# Patient Record
Sex: MALE | Race: WHITE | NOT HISPANIC OR LATINO | URBAN - METROPOLITAN AREA
[De-identification: names, ages, dates, MRNs, and addresses within clinical notes are randomized per-mention and may not be internally consistent; named-entity substitution may affect disease eponyms.]

---

## 2017-01-04 ENCOUNTER — OUTPATIENT (OUTPATIENT)
Dept: OUTPATIENT SERVICES | Facility: HOSPITAL | Age: 72
LOS: 1 days | End: 2017-01-04
Payer: MEDICARE

## 2017-01-04 ENCOUNTER — APPOINTMENT (OUTPATIENT)
Dept: MRI IMAGING | Facility: CLINIC | Age: 72
End: 2017-01-04

## 2017-01-04 DIAGNOSIS — Z00.8 ENCOUNTER FOR OTHER GENERAL EXAMINATION: ICD-10-CM

## 2017-01-04 PROCEDURE — A9585: CPT

## 2017-01-04 PROCEDURE — 70553 MRI BRAIN STEM W/O & W/DYE: CPT

## 2017-01-04 PROCEDURE — 82565 ASSAY OF CREATININE: CPT

## 2017-01-11 ENCOUNTER — RESULT CHARGE (OUTPATIENT)
Age: 72
End: 2017-01-11

## 2017-01-11 ENCOUNTER — APPOINTMENT (OUTPATIENT)
Dept: INFUSION THERAPY | Facility: CLINIC | Age: 72
End: 2017-01-11

## 2017-01-11 ENCOUNTER — LABORATORY RESULT (OUTPATIENT)
Age: 72
End: 2017-01-11

## 2017-01-11 VITALS
BODY MASS INDEX: 26.52 KG/M2 | HEIGHT: 68 IN | TEMPERATURE: 98.3 F | DIASTOLIC BLOOD PRESSURE: 83 MMHG | SYSTOLIC BLOOD PRESSURE: 120 MMHG | WEIGHT: 175 LBS | HEART RATE: 89 BPM

## 2017-01-11 LAB
BILIRUB UR QL STRIP: NEGATIVE
CLARITY UR: ABNORMAL
COLLECTION METHOD: NORMAL
GLUCOSE UR-MCNC: NEGATIVE
HCG UR QL: 1 EU/DL
HCT VFR BLD CALC: 44.6 %
HGB BLD-MCNC: 15.7 G/DL
HGB UR QL STRIP.AUTO: NEGATIVE
KETONES UR-MCNC: ABNORMAL
LEUKOCYTE ESTERASE UR QL STRIP: NEGATIVE
MCHC RBC-ENTMCNC: 35.3 GM/DL
MCHC RBC-ENTMCNC: 40 PG
MCV RBC AUTO: 113 FL
NITRITE UR QL STRIP: NEGATIVE
PH UR STRIP: 6
PLATELET # BLD AUTO: 225 K/UL
PROT UR STRIP-MCNC: ABNORMAL
RBC # BLD: 3.93 M/UL
RBC # FLD: 10.7 %
SP GR UR STRIP: 1.02
WBC # FLD AUTO: 12 K/UL

## 2017-01-12 LAB
ALBUMIN SERPL ELPH-MCNC: 4.6 G/DL
ALP BLD-CCNC: 83 U/L
ALT SERPL-CCNC: 43 U/L
ANION GAP SERPL CALC-SCNC: 19 MMOL/L
AST SERPL-CCNC: 35 U/L
BILIRUB SERPL-MCNC: 0.3 MG/DL
BUN SERPL-MCNC: 20 MG/DL
CALCIUM SERPL-MCNC: 10.1 MG/DL
CEA SERPL-MCNC: 18.6 NG/ML
CHLORIDE SERPL-SCNC: 98 MMOL/L
CO2 SERPL-SCNC: 23 MMOL/L
CREAT SERPL-MCNC: 0.9 MG/DL
GLUCOSE SERPL-MCNC: 117 MG/DL
POTASSIUM SERPL-SCNC: 4.7 MMOL/L
PROT SERPL-MCNC: 7.5 G/DL
SODIUM SERPL-SCNC: 140 MMOL/L

## 2017-01-20 ENCOUNTER — RX RENEWAL (OUTPATIENT)
Age: 72
End: 2017-01-20

## 2017-01-20 RX ORDER — AMLODIPINE BESYLATE 5 MG/1
5 TABLET ORAL
Refills: 0 | Status: ACTIVE | COMMUNITY
Start: 2017-01-20

## 2017-02-01 ENCOUNTER — LABORATORY RESULT (OUTPATIENT)
Age: 72
End: 2017-02-01

## 2017-02-01 ENCOUNTER — RESULT CHARGE (OUTPATIENT)
Age: 72
End: 2017-02-01

## 2017-02-01 ENCOUNTER — APPOINTMENT (OUTPATIENT)
Dept: INFUSION THERAPY | Facility: CLINIC | Age: 72
End: 2017-02-01

## 2017-02-01 ENCOUNTER — APPOINTMENT (OUTPATIENT)
Dept: HEMATOLOGY ONCOLOGY | Facility: CLINIC | Age: 72
End: 2017-02-01

## 2017-02-01 VITALS
TEMPERATURE: 97.5 F | DIASTOLIC BLOOD PRESSURE: 77 MMHG | HEIGHT: 68 IN | SYSTOLIC BLOOD PRESSURE: 126 MMHG | BODY MASS INDEX: 25.61 KG/M2 | HEART RATE: 114 BPM | WEIGHT: 169 LBS

## 2017-02-01 DIAGNOSIS — C79.31 SECONDARY MALIGNANT NEOPLASM OF BRAIN: ICD-10-CM

## 2017-02-01 LAB
HCT VFR BLD CALC: 42 %
HGB BLD-MCNC: 14.3 G/DL
MCHC RBC-ENTMCNC: 34.1 GM/DL
MCHC RBC-ENTMCNC: 37.7 PG
MCV RBC AUTO: 111 FL
PLATELET # BLD AUTO: 396 K/UL
RBC # BLD: 3.79 M/UL
RBC # FLD: 12 %
WBC # FLD AUTO: 13 K/UL

## 2017-02-02 LAB
ALBUMIN SERPL ELPH-MCNC: 4.2 G/DL
ALP BLD-CCNC: 103 U/L
ALT SERPL-CCNC: 40 U/L
ANION GAP SERPL CALC-SCNC: 18 MMOL/L
AST SERPL-CCNC: 61 U/L
BILIRUB SERPL-MCNC: 0.3 MG/DL
BUN SERPL-MCNC: 14 MG/DL
CALCIUM SERPL-MCNC: 10 MG/DL
CHLORIDE SERPL-SCNC: 101 MMOL/L
CO2 SERPL-SCNC: 24 MMOL/L
CREAT SERPL-MCNC: 1.19 MG/DL
GLUCOSE SERPL-MCNC: 124 MG/DL
POTASSIUM SERPL-SCNC: 5.7 MMOL/L
PROT SERPL-MCNC: 7.5 G/DL
SODIUM SERPL-SCNC: 143 MMOL/L

## 2017-02-22 ENCOUNTER — APPOINTMENT (OUTPATIENT)
Dept: INFUSION THERAPY | Facility: CLINIC | Age: 72
End: 2017-02-22

## 2017-02-28 ENCOUNTER — LABORATORY RESULT (OUTPATIENT)
Age: 72
End: 2017-02-28

## 2017-02-28 ENCOUNTER — APPOINTMENT (OUTPATIENT)
Dept: HEMATOLOGY ONCOLOGY | Facility: CLINIC | Age: 72
End: 2017-02-28

## 2017-02-28 ENCOUNTER — APPOINTMENT (OUTPATIENT)
Dept: INFUSION THERAPY | Facility: CLINIC | Age: 72
End: 2017-02-28

## 2017-02-28 VITALS
HEART RATE: 94 BPM | SYSTOLIC BLOOD PRESSURE: 120 MMHG | DIASTOLIC BLOOD PRESSURE: 78 MMHG | RESPIRATION RATE: 16 BRPM | TEMPERATURE: 97.9 F

## 2017-02-28 VITALS
SYSTOLIC BLOOD PRESSURE: 81 MMHG | BODY MASS INDEX: 25.01 KG/M2 | WEIGHT: 165 LBS | HEART RATE: 104 BPM | HEIGHT: 68 IN | DIASTOLIC BLOOD PRESSURE: 55 MMHG

## 2017-02-28 DIAGNOSIS — I95.9 HYPOTENSION, UNSPECIFIED: ICD-10-CM

## 2017-02-28 DIAGNOSIS — R63.0 ANOREXIA: ICD-10-CM

## 2017-02-28 LAB
HCT VFR BLD CALC: 42.6 %
HGB BLD-MCNC: 14.5 G/DL
MCHC RBC-ENTMCNC: 34 GM/DL
MCHC RBC-ENTMCNC: 36.1 PG
MCV RBC AUTO: 106 FL
PLATELET # BLD AUTO: 359 K/UL
RBC # BLD: 4.02 M/UL
RBC # FLD: 13.9 %
WBC # FLD AUTO: 8.4 K/UL

## 2017-03-01 LAB
ALBUMIN SERPL ELPH-MCNC: 3.2 G/DL
ALP BLD-CCNC: 114 U/L
ALT SERPL-CCNC: 26 U/L
ANION GAP SERPL CALC-SCNC: 26 MMOL/L
AST SERPL-CCNC: 71 U/L
BILIRUB SERPL-MCNC: 0.5 MG/DL
BUN SERPL-MCNC: 10 MG/DL
CALCIUM SERPL-MCNC: 9.6 MG/DL
CEA SERPL-MCNC: 17 NG/ML
CHLORIDE SERPL-SCNC: 95 MMOL/L
CO2 SERPL-SCNC: 14 MMOL/L
CREAT SERPL-MCNC: 1.28 MG/DL
GLUCOSE SERPL-MCNC: 99 MG/DL
POTASSIUM SERPL-SCNC: 4.5 MMOL/L
PROT SERPL-MCNC: 6.4 G/DL
SODIUM SERPL-SCNC: 135 MMOL/L

## 2017-03-06 ENCOUNTER — FORM ENCOUNTER (OUTPATIENT)
Age: 72
End: 2017-03-06

## 2017-03-07 ENCOUNTER — APPOINTMENT (OUTPATIENT)
Dept: CT IMAGING | Facility: CLINIC | Age: 72
End: 2017-03-07

## 2017-03-07 ENCOUNTER — OUTPATIENT (OUTPATIENT)
Dept: OUTPATIENT SERVICES | Facility: HOSPITAL | Age: 72
LOS: 1 days | End: 2017-03-07
Payer: MEDICARE

## 2017-03-07 DIAGNOSIS — Z00.8 ENCOUNTER FOR OTHER GENERAL EXAMINATION: ICD-10-CM

## 2017-03-07 PROCEDURE — 82565 ASSAY OF CREATININE: CPT

## 2017-03-07 PROCEDURE — 71260 CT THORAX DX C+: CPT

## 2017-03-07 PROCEDURE — 74177 CT ABD & PELVIS W/CONTRAST: CPT

## 2017-03-08 ENCOUNTER — LABORATORY RESULT (OUTPATIENT)
Age: 72
End: 2017-03-08

## 2017-03-08 ENCOUNTER — RESULT CHARGE (OUTPATIENT)
Age: 72
End: 2017-03-08

## 2017-03-08 ENCOUNTER — APPOINTMENT (OUTPATIENT)
Dept: INFUSION THERAPY | Facility: CLINIC | Age: 72
End: 2017-03-08

## 2017-03-08 ENCOUNTER — APPOINTMENT (OUTPATIENT)
Dept: HEMATOLOGY ONCOLOGY | Facility: CLINIC | Age: 72
End: 2017-03-08

## 2017-03-08 VITALS
HEIGHT: 68 IN | WEIGHT: 164 LBS | TEMPERATURE: 97.3 F | SYSTOLIC BLOOD PRESSURE: 89 MMHG | DIASTOLIC BLOOD PRESSURE: 58 MMHG | BODY MASS INDEX: 24.86 KG/M2 | HEART RATE: 70 BPM

## 2017-03-08 DIAGNOSIS — Z79.899 ENCOUNTER FOR PALLIATIVE CARE: ICD-10-CM

## 2017-03-08 DIAGNOSIS — Z51.5 ENCOUNTER FOR PALLIATIVE CARE: ICD-10-CM

## 2017-03-08 DIAGNOSIS — C34.90 MALIGNANT NEOPLASM OF UNSPECIFIED PART OF UNSPECIFIED BRONCHUS OR LUNG: ICD-10-CM

## 2017-03-09 LAB
ANION GAP SERPL CALC-SCNC: 28 MMOL/L
BUN SERPL-MCNC: 9 MG/DL
CALCIUM SERPL-MCNC: 9.8 MG/DL
CHLORIDE SERPL-SCNC: 100 MMOL/L
CO2 SERPL-SCNC: 14 MMOL/L
CREAT SERPL-MCNC: 1.17 MG/DL
GLUCOSE SERPL-MCNC: 92 MG/DL
HCT VFR BLD CALC: 41.2 %
HGB BLD-MCNC: 13.9 G/DL
MCHC RBC-ENTMCNC: 33.8 GM/DL
MCHC RBC-ENTMCNC: 35.8 PG
MCV RBC AUTO: 106 FL
PLATELET # BLD AUTO: 293 K/UL
POTASSIUM SERPL-SCNC: 4.1 MMOL/L
RBC # BLD: 3.88 M/UL
RBC # FLD: 14.5 %
SODIUM SERPL-SCNC: 142 MMOL/L
WBC # FLD AUTO: 8.3 K/UL

## 2017-03-10 ENCOUNTER — APPOINTMENT (OUTPATIENT)
Dept: INFUSION THERAPY | Facility: CLINIC | Age: 72
End: 2017-03-10

## 2017-03-10 VITALS
TEMPERATURE: 97.4 F | DIASTOLIC BLOOD PRESSURE: 65 MMHG | WEIGHT: 166 LBS | HEART RATE: 112 BPM | HEIGHT: 68 IN | BODY MASS INDEX: 25.16 KG/M2 | SYSTOLIC BLOOD PRESSURE: 93 MMHG

## 2017-03-29 ENCOUNTER — APPOINTMENT (OUTPATIENT)
Dept: INFUSION THERAPY | Facility: CLINIC | Age: 72
End: 2017-03-29

## 2017-03-31 ENCOUNTER — APPOINTMENT (OUTPATIENT)
Dept: INFUSION THERAPY | Facility: CLINIC | Age: 72
End: 2017-03-31

## 2017-03-31 ENCOUNTER — INPATIENT (INPATIENT)
Facility: HOSPITAL | Age: 72
LOS: 3 days | End: 2017-04-04
Attending: FAMILY MEDICINE | Admitting: FAMILY MEDICINE
Payer: SELF-PAY

## 2017-03-31 VITALS
WEIGHT: 162.04 LBS | HEIGHT: 68 IN | HEART RATE: 76 BPM | SYSTOLIC BLOOD PRESSURE: 104 MMHG | DIASTOLIC BLOOD PRESSURE: 64 MMHG | RESPIRATION RATE: 16 BRPM

## 2017-03-31 DIAGNOSIS — N17.9 ACUTE KIDNEY FAILURE, UNSPECIFIED: ICD-10-CM

## 2017-03-31 DIAGNOSIS — E86.0 DEHYDRATION: ICD-10-CM

## 2017-03-31 DIAGNOSIS — D63.0 ANEMIA IN NEOPLASTIC DISEASE: ICD-10-CM

## 2017-03-31 DIAGNOSIS — C34.90 MALIGNANT NEOPLASM OF UNSPECIFIED PART OF UNSPECIFIED BRONCHUS OR LUNG: ICD-10-CM

## 2017-03-31 DIAGNOSIS — C79.31 SECONDARY MALIGNANT NEOPLASM OF BRAIN: ICD-10-CM

## 2017-03-31 DIAGNOSIS — R26.9 UNSPECIFIED ABNORMALITIES OF GAIT AND MOBILITY: ICD-10-CM

## 2017-03-31 DIAGNOSIS — R74.8 ABNORMAL LEVELS OF OTHER SERUM ENZYMES: ICD-10-CM

## 2017-03-31 LAB
ALBUMIN SERPL ELPH-MCNC: 2.6 G/DL — LOW (ref 3.3–5)
ALP SERPL-CCNC: 125 U/L — HIGH (ref 40–120)
ALT FLD-CCNC: 32 U/L — SIGNIFICANT CHANGE UP (ref 12–78)
ANION GAP SERPL CALC-SCNC: 14 MMOL/L — SIGNIFICANT CHANGE UP (ref 5–17)
ANISOCYTOSIS BLD QL: SLIGHT — SIGNIFICANT CHANGE UP
APTT BLD: 53.3 SEC — HIGH (ref 27.5–37.4)
AST SERPL-CCNC: 139 U/L — HIGH (ref 15–37)
BASOPHILS # BLD AUTO: 0.1 K/UL — SIGNIFICANT CHANGE UP (ref 0–0.2)
BASOPHILS NFR BLD AUTO: 1 % — SIGNIFICANT CHANGE UP (ref 0–2)
BILIRUB SERPL-MCNC: 0.4 MG/DL — SIGNIFICANT CHANGE UP (ref 0.2–1.2)
BUN SERPL-MCNC: 15 MG/DL — SIGNIFICANT CHANGE UP (ref 7–23)
CALCIUM SERPL-MCNC: 9.2 MG/DL — SIGNIFICANT CHANGE UP (ref 8.5–10.1)
CHLORIDE SERPL-SCNC: 96 MMOL/L — SIGNIFICANT CHANGE UP (ref 96–108)
CO2 SERPL-SCNC: 25 MMOL/L — SIGNIFICANT CHANGE UP (ref 22–31)
CREAT SERPL-MCNC: 1.54 MG/DL — HIGH (ref 0.5–1.3)
DACRYOCYTES BLD QL SMEAR: SLIGHT — SIGNIFICANT CHANGE UP
EOSINOPHIL # BLD AUTO: 0.3 K/UL — SIGNIFICANT CHANGE UP (ref 0–0.5)
EOSINOPHIL NFR BLD AUTO: 5 % — SIGNIFICANT CHANGE UP (ref 0–6)
GLUCOSE SERPL-MCNC: 79 MG/DL — SIGNIFICANT CHANGE UP (ref 70–99)
HCT VFR BLD CALC: 38.4 % — LOW (ref 39–50)
HGB BLD-MCNC: 13 G/DL — SIGNIFICANT CHANGE UP (ref 13–17)
INR BLD: 1.18 RATIO — HIGH (ref 0.88–1.16)
LG PLATELETS BLD QL AUTO: SLIGHT — SIGNIFICANT CHANGE UP
LYMPHOCYTES # BLD AUTO: 1.4 K/UL — SIGNIFICANT CHANGE UP (ref 1–3.3)
LYMPHOCYTES # BLD AUTO: 36 % — SIGNIFICANT CHANGE UP (ref 13–44)
MACROCYTES BLD QL: SLIGHT — SIGNIFICANT CHANGE UP
MANUAL DIF COMMENT BLD-IMP: SIGNIFICANT CHANGE UP
MCHC RBC-ENTMCNC: 33.8 GM/DL — SIGNIFICANT CHANGE UP (ref 32–36)
MCHC RBC-ENTMCNC: 35.4 PG — HIGH (ref 27–34)
MCV RBC AUTO: 104.8 FL — HIGH (ref 80–100)
MONOCYTES # BLD AUTO: 1.2 K/UL — HIGH (ref 0–0.9)
MONOCYTES NFR BLD AUTO: 21 % — HIGH (ref 2–14)
NEUTROPHILS # BLD AUTO: 1.7 K/UL — LOW (ref 1.8–7.4)
NEUTROPHILS NFR BLD AUTO: 36 % — LOW (ref 43–77)
NEUTS BAND # BLD: 1 % — SIGNIFICANT CHANGE UP (ref 0–8)
NRBC # BLD: 2 /100 — HIGH (ref 0–0)
PLAT MORPH BLD: NORMAL — SIGNIFICANT CHANGE UP
PLATELET # BLD AUTO: 239 K/UL — SIGNIFICANT CHANGE UP (ref 150–400)
PLATELET COUNT - ESTIMATE: NORMAL — SIGNIFICANT CHANGE UP
POIKILOCYTOSIS BLD QL AUTO: SLIGHT — SIGNIFICANT CHANGE UP
POTASSIUM SERPL-MCNC: 3.1 MMOL/L — LOW (ref 3.5–5.3)
POTASSIUM SERPL-SCNC: 3.1 MMOL/L — LOW (ref 3.5–5.3)
PROT SERPL-MCNC: 6.7 GM/DL — SIGNIFICANT CHANGE UP (ref 6–8.3)
PROTHROM AB SERPL-ACNC: 12.8 SEC — HIGH (ref 9.8–12.7)
RBC # BLD: 3.66 M/UL — LOW (ref 4.2–5.8)
RBC # FLD: 16.4 % — HIGH (ref 10.3–14.5)
RBC BLD AUTO: (no result)
SODIUM SERPL-SCNC: 135 MMOL/L — SIGNIFICANT CHANGE UP (ref 135–145)
TARGETS BLD QL SMEAR: SLIGHT — SIGNIFICANT CHANGE UP
WBC # BLD: 4.8 K/UL — SIGNIFICANT CHANGE UP (ref 3.8–10.5)
WBC # FLD AUTO: 4.8 K/UL — SIGNIFICANT CHANGE UP (ref 3.8–10.5)

## 2017-03-31 PROCEDURE — 71010: CPT | Mod: 26

## 2017-03-31 PROCEDURE — 93010 ELECTROCARDIOGRAM REPORT: CPT

## 2017-03-31 PROCEDURE — 70450 CT HEAD/BRAIN W/O DYE: CPT | Mod: 26

## 2017-03-31 PROCEDURE — 99285 EMERGENCY DEPT VISIT HI MDM: CPT

## 2017-03-31 RX ORDER — ACETAMINOPHEN 500 MG
650 TABLET ORAL EVERY 6 HOURS
Qty: 0 | Refills: 0 | Status: DISCONTINUED | OUTPATIENT
Start: 2017-03-31 | End: 2017-04-04

## 2017-03-31 RX ORDER — SODIUM CHLORIDE 9 MG/ML
1000 INJECTION INTRAMUSCULAR; INTRAVENOUS; SUBCUTANEOUS ONCE
Qty: 0 | Refills: 0 | Status: COMPLETED | OUTPATIENT
Start: 2017-03-31 | End: 2017-03-31

## 2017-03-31 RX ORDER — SENNA PLUS 8.6 MG/1
2 TABLET ORAL AT BEDTIME
Qty: 0 | Refills: 0 | Status: DISCONTINUED | OUTPATIENT
Start: 2017-03-31 | End: 2017-04-04

## 2017-03-31 RX ORDER — FOLIC ACID 0.8 MG
0 TABLET ORAL
Qty: 0 | Refills: 0 | COMMUNITY

## 2017-03-31 RX ORDER — FOLIC ACID 0.8 MG
1 TABLET ORAL
Qty: 0 | Refills: 0 | COMMUNITY

## 2017-03-31 RX ORDER — SODIUM CHLORIDE 9 MG/ML
1000 INJECTION, SOLUTION INTRAVENOUS
Qty: 0 | Refills: 0 | Status: DISCONTINUED | OUTPATIENT
Start: 2017-03-31 | End: 2017-04-01

## 2017-03-31 RX ORDER — FOLIC ACID 0.8 MG
1 TABLET ORAL DAILY
Qty: 0 | Refills: 0 | Status: DISCONTINUED | OUTPATIENT
Start: 2017-03-31 | End: 2017-04-04

## 2017-03-31 RX ORDER — HEPARIN SODIUM 5000 [USP'U]/ML
5000 INJECTION INTRAVENOUS; SUBCUTANEOUS EVERY 12 HOURS
Qty: 0 | Refills: 0 | Status: DISCONTINUED | OUTPATIENT
Start: 2017-03-31 | End: 2017-04-04

## 2017-03-31 RX ORDER — POTASSIUM CHLORIDE 20 MEQ
20 PACKET (EA) ORAL
Qty: 0 | Refills: 0 | Status: COMPLETED | OUTPATIENT
Start: 2017-03-31 | End: 2017-03-31

## 2017-03-31 RX ADMIN — Medication 20 MILLIEQUIVALENT(S): at 23:30

## 2017-03-31 RX ADMIN — SODIUM CHLORIDE 1000 MILLILITER(S): 9 INJECTION INTRAMUSCULAR; INTRAVENOUS; SUBCUTANEOUS at 17:33

## 2017-03-31 RX ADMIN — Medication 20 MILLIEQUIVALENT(S): at 21:23

## 2017-03-31 RX ADMIN — HEPARIN SODIUM 5000 UNIT(S): 5000 INJECTION INTRAVENOUS; SUBCUTANEOUS at 21:23

## 2017-03-31 RX ADMIN — SODIUM CHLORIDE 1000 MILLILITER(S): 9 INJECTION INTRAMUSCULAR; INTRAVENOUS; SUBCUTANEOUS at 15:04

## 2017-03-31 RX ADMIN — Medication 1 MILLIGRAM(S): at 21:23

## 2017-03-31 RX ADMIN — SODIUM CHLORIDE 125 MILLILITER(S): 9 INJECTION, SOLUTION INTRAVENOUS at 21:23

## 2017-03-31 NOTE — H&P ADULT - NSHPREVIEWOFSYSTEMS_GEN_ALL_CORE
REVIEW OF SYSTEMS:    CONSTITUTIONAL:  + fatigue, + weight loss  HEENT:  Eyes:  No visual loss, blurred vision, double vision or yellow sclerae. Ears, Nose, Throat:  No hearing loss, sneezing, congestion, runny nose or sore throat.  SKIN:  No rash or itching.  CARDIOVASCULAR:  No chest pain, chest pressure or chest discomfort. No palpitations or edema.  RESPIRATORY:  No shortness of breath, cough or sputum.  GASTROINTESTINAL: + anorexia, no nausea, vomiting or diarrhea. No abdominal pain or blood.  GENITOURINARY:  no burning on urination.  NEUROLOGICAL:  No headache, dizziness, syncope, paralysis, ataxia, numbness or tingling in the extremities. No change in bowel or bladder control.  MUSCULOSKELETAL:  No muscle, back pain, joint pain or stiffness.  HEMATOLOGIC:  No anemia, bleeding or bruising.  LYMPHATICS:  No enlarged nodes. No history of splenectomy.  PSYCHIATRIC:  No history of depression or anxiety.  ENDOCRINOLOGIC:  No reports of sweating, cold or heat intolerance. No polyuria or polydipsia.  ALLERGIES:  No history of asthma, hives, eczema or rhinitis.

## 2017-03-31 NOTE — H&P ADULT - PROBLEM SELECTOR PLAN 1
r/o progression of brain metastses   MRI head in am  PT consult for gait training  Heme/Onc Consult  pending results of MRI may consider Rad/onc reconsult with Dr. Live

## 2017-03-31 NOTE — ED ADULT NURSE REASSESSMENT NOTE - NS ED NURSE REASSESS COMMENT FT1
Recd care of patient at 1900 A&O x 4. Offers no c/o, denies CP, dizziness, SOB. Given dinner tray, tolerating without nausea. VS as charted. Awaiting inpatient orders and bed. Will continue to monitor.

## 2017-03-31 NOTE — ED PROVIDER NOTE - OBJECTIVE STATEMENT
72 y/o male with PMhx of Lung CA, with metastasis to the shahid s/p cyberknife surgery by Dr. 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 He states that this morning he was having difficulty standing on his feet and reports decreased PO intake and generalized weakness. Pt states that he went for his chemotherapy treatment today and was told to come to the ED because of his difficulty standing. Currently pt has no other complaints and denies chest pain, SOB, abd pain and fever. Oncologist Dr. Juarez. 70 y/o male with PMhx of Lung CA, with metastasis to the shahid s/p cyberknife surgery by Dr. 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 He states that this morning he was having difficulty standing on his feet and reports decreased PO intake and generalized weakness. Pt states spoke with his Oncologist today and was told to come to the ED because of his difficulty standing. Pt feels very unsteady like he will fall.  Last chemo he states was a week ago.  Currently pt has no other complaints and denies chest pain, SOB, abd pain and fever. Oncologist Dr. Juarez.

## 2017-03-31 NOTE — H&P ADULT - ASSESSMENT
[age] y/o male with Stage IV small cell lung cancer that presents with progressively worsening weakness, gait imbalance, and fatigue    Admit to Med/surg  OOB to chair with assistance  Fall Risk Protocol   Vitals per routine

## 2017-03-31 NOTE — ED ADULT NURSE REASSESSMENT NOTE - NS ED NURSE REASSESS COMMENT FT1
Report given to Marcel, pt awaiting transport. Spoke with Dr Michel, he will order potassium replacement and MRI. 1N Rn aware as well.

## 2017-03-31 NOTE — ED PROVIDER NOTE - NEUROLOGICAL, MLM
Alert and oriented, no focal deficits, no motor or sensory deficits. Alert and oriented, no focal deficits. Mild ataxia with finger to nose bilaterally.

## 2017-03-31 NOTE — ED PROVIDER NOTE - DETAILS:
I, Sandeep Pathak, performed the initial face to face bedside interview with this patient regarding history of present illness, review of symptoms and relevant past medical, social and family history.  I completed an independent physical examination.  I was the initial provider who evaluated this patient.  The history, relevant review of systems, past medical and surgical history, medical decision making, and physical examination was documented by the scribe in my presence and I attest to the accuracy of the documentation.

## 2017-03-31 NOTE — H&P ADULT - HISTORY OF PRESENT ILLNESS
70 y/o patient with metastatic non small cell lung cancer  that presents to  with complaints of worsening weakness/ dizziness/ ataxia.Patient has history of Metastatic adenocarcinoma of lung. Presented in July 2016 with extensive brain mets, left hilar mass, adenopathy, adrenal mets, retroperitoneal adenopathy , extensive bone mets, abdominal carcinomatosis. He is s/p cyberknife RT to brain mets, RT to thoracic spine.  S/p multiple courses of chemotherapy with good response.  Currently on maintenance with Alimta and avastin- last given on March 10.  CT chest, abdomen and pelvis March7, 2017- ongoing response, continuous improvement. MRI of brain January 2017- response/ improvement in brain mets.  Had intermittent dizziness, poor equilibrium on and off x several months - now progressively worse. He states that he has also had weight loss, poor appetite, and decreased energy such that he is unable to perform ADL's such as bathing or preparing food.  Lives alone at home. No family, no friends, no community support.

## 2017-03-31 NOTE — ED PROVIDER NOTE - CARE PLAN
Principal Discharge DX:	Unsteadiness on feet  Secondary Diagnosis:	Lung cancer  Secondary Diagnosis:	Dehydration

## 2017-03-31 NOTE — ED PROVIDER NOTE - PROGRESS NOTE DETAILS
Pt ambulated and is unsteady, wobbling after a few feet.  Had to catch himself.  Fall risk while ambulating.

## 2017-03-31 NOTE — ED ADULT NURSE NOTE - OBJECTIVE STATEMENT
Pt arrived to ER c/o dizziness when standing x 4-5 months with today being much worse Pt arrived to ER c/o dizziness when standing x 4-5 months with today being much worse. Pt denies falling, SOB or chest pain.  Pt denies dizziness at this time.

## 2017-03-31 NOTE — CHART NOTE - NSCHARTNOTEFT_GEN_A_CORE
Reason For Visit  MYLENE CRAIN is being seen for a follow-up visit. Evaluation and treatment: metastatic adenocarcinoma of lung, on Alimta and avastin " maintenance" , here to discuss results of scans.      History of Present Illness    Presented in July 2016 with intermittent vision changes while driving and loss of left peripheral vision. MRI of brain showed metastatic disease in jayy, parietal lobe and cerebellum.CT scan of chest, abdomen and pelvis revealed left hilar mass with metastatic adenopathy, b/l adrenal mets, retroperitoneal adenopathy, abdominal carcinomatosis, bone mets with T4 and T 10 involvement and right proximal femur lesion. MRI of spine showed large destructive tumor at T4 with epidural compression but no overt cord compression. Biopsy of left adrenal mass showed metastatic adenocarcinoma consistent with metastasis from lung primary.     S/p cyberknife RT to brain metastases and RT to thoracic spine.  S/p 5 cycles of pemetrexed, carboplatin, avastin. Good response by CT scans in October.     Currently on Alimta/ Avastin maintenance. Had follow up scans yesterday.        Disease: lung cancer   Pathology: adenocarcinoma   TNM stage: M1   AJCC Stage: IV     negative for EGFR mutations, ALK mutation and ROS1  PDL1 80 %       Interval History: Stopped BP medications several days ago ( BP was low).    Fatigued. Appetite not as good but no GI c/o. Admits that he does not have a habit of drinking any fluids thorough the day.    Had CT scan yest. Creat was 1.6 yest in radiology- got lower dose of contrast and told to increase po fluids.        Active Problems  Non-small cell lung cancer (162.9) (C34.90)  On palliative antineoplastic chemotherapy (V66.7,V58.69) (Z51.5,Z79.899)  Anorexia (783.0) (R63.0)  Bone metastases (198.5) (C79.51)  Brain metastases (198.3) (C79.31)  Hypertension (401.9) (I10)  Hypertension (401.9) (I10)  Hypotension (458.9) (I95.9)  Neoplastic malignant related fatigue (780.79) (R53.0)    Past Medical History  History of S/P radiation therapy (V66.1) (Z92.3)    Surgical History  History of Repair Of A Laceration Of The Eye    Family History  No pertinent family history : Mother, Father    Social History  Chooses not to have children (V25.9) (Z30.9)  Current every day smoker (305.1) (F17.200)  Retired  Single    Current Meds  Aleve TABS  AmLODIPine Besylate 5 MG Oral Tablet  Centrum Silver Oral Tablet; TAKE 1 TABLET DAILY  Folic Acid 1 MG Oral Tablet; TAKE 1 TABLET DAILY    Allergies  No Known Drug Allergies    Review of Systems    Patient intake form was reviewed.   Constitutional: fatigue . per HPI.   Gastrointestinal:. loss of appetite.   Neurological:. chronic imbalance- stable, no new neurologic issues.   Psychiatric: no depression . sadness.   Eyes, ENT, Cardiovascular, Respiratory, Genitourinary and Musculoskeletal review of systems are otherwise negative except as noted in HPI.      Physical Exam    Constitutional: well developed, well nourished, in no acute distress . looks OK- better than last week.   Neck: supple without JVD, no thyromegaly or masses appreciated.   Pulmonary: clear to auscultation bilaterally, no dullness, no wheezing.   Cardiac: RRR, normal S1S2, no murmurs, rubs, gallops.   Vascular: no JVD, no calf tenderness, venous stasis changes, varices.   Abdomen: normoactive bowel sounds, soft and nontender, no hepatosplenomegaly or masses appreciated.   Musculoskeletal: full range of motion and no deformities appreciated.   Neurology: grossly intact . gait steady.   Psychiatric: affect appropriate.      Results/Data    CT scan chest/ abdomen and pelvic 3/7/17 compared to October 2016- continuous improvement in multiple areas of metastatic disease in the chest and abdomen.      Assessment  Non-small cell lung cancer (162.9) (C34.90)  On palliative antineoplastic chemotherapy (V66.7,V58.69) (Z51.5,Z79.899)    70 y/o male heavy smoker with metastatic adenocarcinoma of lung with brain, bone, lymph node mets, abdominal carcinomatosis.  S/p cyberknife to brain mets, s/p RT to thoracic spine  S/p Alimta/ carboplatin/ avastin with response.  Currently on Alimta/Avastin maintenance.    Ongoing continuous response to systemic therapy.     Anorexia due to malignancy/ depression. Not interested in antidepressants.    Recent increase in creatinine- likely due to dehydration due to poor oral intake.  Discussed again the importance of adequate nutrition and hydration.  Hydrate with NS today.    He will return on 3/10 for additional hydration and for next cycle of chemotherapy.  He understands that his disease is incurable. Wants to continue therapy.   Physical exam in 6 weeks.        End of Visit    Counseling/Coordination of Care: Greater than 50% of the encounter time was spent on counseling and coordination of care for metastatic lung cancer, on chemotherapy and I have spent 30 min minutes of face to face time with the patient.      Electronically signed by : NELA BREAUX MD; Mar  8 2017  5:45PM EST (Author)

## 2017-03-31 NOTE — ED PROVIDER NOTE - NS ED MD SCRIBE ATTENDING SCRIBE SECTIONS
REVIEW OF SYSTEMS/PROGRESS NOTE/DISPOSITION/HISTORY OF PRESENT ILLNESS/PHYSICAL EXAM/PAST MEDICAL/SURGICAL/SOCIAL HISTORY/RESULTS

## 2017-03-31 NOTE — ED PROVIDER NOTE - MEDICAL DECISION MAKING DETAILS
Pt with dizziness. Plan blood work, imaging and monitoring. Pt with dizziness. Plan blood work, imaging and monitoring.  [facility]son MDM:  CT head, will likely need MRI for further evaluation given hx of brain mets and tx.  Labs show creatinine of 1.5.  IVF bolus.  I s/w pts oncologist.  Pt unsteady, lives alone, high risk of falling.  Plan to be admitted.  Will need further neuroimaging.

## 2017-03-31 NOTE — PATIENT PROFILE ADULT. - FALL HARM RISK
bones(Osteoporosis,prev fx,steroid use,metastatic bone ca/weakness/other/coagulation(Bleeding disorder R/T clinical cond/anti-coags)

## 2017-03-31 NOTE — H&P ADULT - PROBLEM SELECTOR PLAN 4
s/p 1 liter of fluid in ER would give additional liter of LR then maintenance fluids at 125 cc/hr of LR  check bmp in am

## 2017-04-01 DIAGNOSIS — T68.XXXA HYPOTHERMIA, INITIAL ENCOUNTER: ICD-10-CM

## 2017-04-01 LAB
ANION GAP SERPL CALC-SCNC: 15 MMOL/L — SIGNIFICANT CHANGE UP (ref 5–17)
APPEARANCE UR: CLEAR — SIGNIFICANT CHANGE UP
BACTERIA # UR AUTO: (no result)
BASOPHILS # BLD AUTO: 0.1 K/UL — SIGNIFICANT CHANGE UP (ref 0–0.2)
BASOPHILS NFR BLD AUTO: 2.6 % — HIGH (ref 0–2)
BILIRUB UR-MCNC: (no result)
BUN SERPL-MCNC: 14 MG/DL — SIGNIFICANT CHANGE UP (ref 7–23)
CALCIUM SERPL-MCNC: 7.9 MG/DL — LOW (ref 8.5–10.1)
CHLORIDE SERPL-SCNC: 101 MMOL/L — SIGNIFICANT CHANGE UP (ref 96–108)
CO2 SERPL-SCNC: 20 MMOL/L — LOW (ref 22–31)
COLOR SPEC: YELLOW — SIGNIFICANT CHANGE UP
CREAT SERPL-MCNC: 1.17 MG/DL — SIGNIFICANT CHANGE UP (ref 0.5–1.3)
DIFF PNL FLD: NEGATIVE — SIGNIFICANT CHANGE UP
EOSINOPHIL # BLD AUTO: 0.3 K/UL — SIGNIFICANT CHANGE UP (ref 0–0.5)
EOSINOPHIL NFR BLD AUTO: 6.5 % — HIGH (ref 0–6)
EPI CELLS # UR: SIGNIFICANT CHANGE UP
GLUCOSE SERPL-MCNC: 69 MG/DL — LOW (ref 70–99)
GLUCOSE UR QL: NEGATIVE MG/DL — SIGNIFICANT CHANGE UP
HCT VFR BLD CALC: 31.4 % — LOW (ref 39–50)
HGB BLD-MCNC: 10.4 G/DL — LOW (ref 13–17)
KETONES UR-MCNC: (no result)
LEUKOCYTE ESTERASE UR-ACNC: (no result)
LYMPHOCYTES # BLD AUTO: 1.2 K/UL — SIGNIFICANT CHANGE UP (ref 1–3.3)
LYMPHOCYTES # BLD AUTO: 29.2 % — SIGNIFICANT CHANGE UP (ref 13–44)
MAGNESIUM SERPL-MCNC: 1.5 MG/DL — LOW (ref 1.8–2.4)
MCHC RBC-ENTMCNC: 33.2 GM/DL — SIGNIFICANT CHANGE UP (ref 32–36)
MCHC RBC-ENTMCNC: 35.3 PG — HIGH (ref 27–34)
MCV RBC AUTO: 106.2 FL — HIGH (ref 80–100)
MONOCYTES # BLD AUTO: 1.5 K/UL — HIGH (ref 0–0.9)
MONOCYTES NFR BLD AUTO: 35.9 % — HIGH (ref 2–14)
NEUTROPHILS # BLD AUTO: 1.1 K/UL — LOW (ref 1.8–7.4)
NEUTROPHILS NFR BLD AUTO: 25.7 % — LOW (ref 43–77)
NITRITE UR-MCNC: NEGATIVE — SIGNIFICANT CHANGE UP
PH UR: 5 — SIGNIFICANT CHANGE UP (ref 4.8–8)
PHOSPHATE SERPL-MCNC: 3.3 MG/DL — SIGNIFICANT CHANGE UP (ref 2.5–4.5)
PLATELET # BLD AUTO: 240 K/UL — SIGNIFICANT CHANGE UP (ref 150–400)
POTASSIUM SERPL-MCNC: 3.6 MMOL/L — SIGNIFICANT CHANGE UP (ref 3.5–5.3)
POTASSIUM SERPL-SCNC: 3.6 MMOL/L — SIGNIFICANT CHANGE UP (ref 3.5–5.3)
PREALB SERPL-MCNC: 4 MG/DL — LOW (ref 18–45)
PROT UR-MCNC: 15 MG/DL
RAPID RVP RESULT: SIGNIFICANT CHANGE UP
RBC # BLD: 2.96 M/UL — LOW (ref 4.2–5.8)
RBC # FLD: 17.2 % — HIGH (ref 10.3–14.5)
RBC CASTS # UR COMP ASSIST: SIGNIFICANT CHANGE UP /HPF (ref 0–4)
SODIUM SERPL-SCNC: 136 MMOL/L — SIGNIFICANT CHANGE UP (ref 135–145)
SP GR SPEC: 1.01 — SIGNIFICANT CHANGE UP (ref 1.01–1.02)
UROBILINOGEN FLD QL: 4 MG/DL
WBC # BLD: 4.2 K/UL — SIGNIFICANT CHANGE UP (ref 3.8–10.5)
WBC # FLD AUTO: 4.2 K/UL — SIGNIFICANT CHANGE UP (ref 3.8–10.5)
WBC UR QL: SIGNIFICANT CHANGE UP

## 2017-04-01 PROCEDURE — 93010 ELECTROCARDIOGRAM REPORT: CPT

## 2017-04-01 PROCEDURE — 70553 MRI BRAIN STEM W/O & W/DYE: CPT | Mod: 26

## 2017-04-01 RX ORDER — MAGNESIUM OXIDE 400 MG ORAL TABLET 241.3 MG
400 TABLET ORAL ONCE
Qty: 0 | Refills: 0 | Status: COMPLETED | OUTPATIENT
Start: 2017-04-01 | End: 2017-04-01

## 2017-04-01 RX ORDER — SODIUM CHLORIDE 9 MG/ML
1000 INJECTION, SOLUTION INTRAVENOUS
Qty: 0 | Refills: 0 | Status: DISCONTINUED | OUTPATIENT
Start: 2017-04-01 | End: 2017-04-01

## 2017-04-01 RX ORDER — SODIUM CHLORIDE 9 MG/ML
1000 INJECTION INTRAMUSCULAR; INTRAVENOUS; SUBCUTANEOUS
Qty: 0 | Refills: 0 | Status: DISCONTINUED | OUTPATIENT
Start: 2017-04-01 | End: 2017-04-04

## 2017-04-01 RX ORDER — SODIUM CHLORIDE 9 MG/ML
1000 INJECTION INTRAMUSCULAR; INTRAVENOUS; SUBCUTANEOUS ONCE
Qty: 0 | Refills: 0 | Status: COMPLETED | OUTPATIENT
Start: 2017-04-01 | End: 2017-04-01

## 2017-04-01 RX ORDER — SODIUM FERRIC GLUCONAT/SUCROSE 62.5MG/5ML
125 AMPUL (ML) INTRAVENOUS DAILY
Qty: 0 | Refills: 0 | Status: DISCONTINUED | OUTPATIENT
Start: 2017-04-01 | End: 2017-04-03

## 2017-04-01 RX ADMIN — HEPARIN SODIUM 5000 UNIT(S): 5000 INJECTION INTRAVENOUS; SUBCUTANEOUS at 18:08

## 2017-04-01 RX ADMIN — SODIUM CHLORIDE 1000 MILLILITER(S): 9 INJECTION INTRAMUSCULAR; INTRAVENOUS; SUBCUTANEOUS at 15:02

## 2017-04-01 RX ADMIN — SODIUM CHLORIDE 125 MILLILITER(S): 9 INJECTION, SOLUTION INTRAVENOUS at 05:22

## 2017-04-01 RX ADMIN — HEPARIN SODIUM 5000 UNIT(S): 5000 INJECTION INTRAVENOUS; SUBCUTANEOUS at 05:22

## 2017-04-01 RX ADMIN — SODIUM CHLORIDE 100 MILLILITER(S): 9 INJECTION INTRAMUSCULAR; INTRAVENOUS; SUBCUTANEOUS at 16:39

## 2017-04-01 RX ADMIN — MAGNESIUM OXIDE 400 MG ORAL TABLET 400 MILLIGRAM(S): 241.3 TABLET ORAL at 11:58

## 2017-04-01 RX ADMIN — Medication 1 MILLIGRAM(S): at 11:58

## 2017-04-01 NOTE — CHART NOTE - NSCHARTNOTEFT_GEN_A_CORE
MRI negative for recurrence of any brain matter lesions. Now having intermittent visual hallucinations according to nursing staff. Pan cultures in progress. Will montior off abx until any infectious process declares itself if any. Check RVP. Neuro consult. MRA for poss VBI to be deferred to neurology as MRI was performed this am and not consistent with new symptoms patient is now presenting with now. Cont with IVF for hypotension

## 2017-04-01 NOTE — DIETITIAN INITIAL EVALUATION ADULT. - NS AS NUTRI DX NUTRIENT
Pt meets criteria for severe protein-calorie malnutrition in chronic disease.  Pt has lung cancer with mets to brain. Pt reports 10% wt loss in past 6 months, with PO intake <75% needs, > 1 month. Nutrition focused physical exam reveals severe muscle depletion in temporal, clavicle,  shoulder  and thigh area, moderate muscle wasting in calves and deltoids, severe fat depletion in orbital and tricep areas./Malnutrition

## 2017-04-01 NOTE — DIETITIAN INITIAL EVALUATION ADULT. - ENERGY NEEDS
Ht 68 in, Wt 73.4 kg,  IBW 71 kg,   Pt at 103% IBW. BMI 24.6. Pt has 10% wt loss in 6 months. Charles 17, No edema reported

## 2017-04-01 NOTE — DIETITIAN INITIAL EVALUATION ADULT. - OTHER INFO
Pt reports 10% wt loss in 6 months, with initiation of chemotherapy. pt appears well nourished, NFPE reveals severe muscle and fat depletion. Pt consumes about 50% of meals at , has had limited appetite in past 6 months. N/V resolved.  Pt ammenable to nutritional supplements.

## 2017-04-01 NOTE — PHYSICAL THERAPY INITIAL EVALUATION ADULT - PERTINENT HX OF CURRENT PROBLEM, REHAB EVAL
72 y/o patient with metastatic non small cell lung cancer  that presents to  with complaints of worsening weakness/ dizziness/ ataxia.  He is s/p cyberknife RT to brain mets, RT to thoracic spine.  S/p multiple courses of chemotherapy with good response.

## 2017-04-01 NOTE — CONSULT NOTE ADULT - SUBJECTIVE AND OBJECTIVE BOX
87 yo M w multiple chronic medical problems including '  Radiation proctitis, afib  on Xarelto till earlier this week  recurrent rectal bleeding,recently in [facility] for rectal bleeding,  not transfused and not given iron supplements;   now Anemia decreased Plts  labs show decreased  Iron /iron saturation    Plan: Iron supplementation  Ferrlecit 125 IV qd x 5 days  Folic acid 1 mg qd x weeks  Nutrition consult ,increase protein.  Above reviewed w wife at bedside. APRIL 4 2017   NOTE BELOW ENTERED IN ERROR / WRONG PATIENT   PLEASE SEE CORRECT  PROGRESS NOTE FROM EARLIER THAT SAME DAY.      87 yo M w multiple chronic medical problems including '  Radiation proctitis, afib  on Xarelto till earlier this week  recurrent rectal bleeding, recently in [facility] for rectal bleeding,  not transfused and not given iron supplements;   now Anemia decreased Plts  labs show decreased  Iron /iron saturation    Plan: Iron supplementation  Ferrlecit 125 IV qd x 5 days  Folic acid 1 mg qd x weeks  Nutrition consult ,increase protein.  Above reviewed w wife at bedside.     xxxxxxxxxx

## 2017-04-01 NOTE — DIETITIAN INITIAL EVALUATION ADULT. - PHYSICAL APPEARANCE
pt appears well nourished, however nutrition focused physical exam reveals severe muscle depletion in temporal, clavicle,  shoulder  and thigh area, moderate muscle wasting in calves and deltoids, severe fat depletion in orbital and tricep areas.

## 2017-04-01 NOTE — PHYSICAL THERAPY INITIAL EVALUATION ADULT - DIAGNOSIS, PT EVAL
71 y.o male with hx of metastatic lung cancer with mets to the brain, presenting with Gait disturbance

## 2017-04-01 NOTE — PHYSICAL THERAPY INITIAL EVALUATION ADULT - GENERAL OBSERVATIONS, REHAB EVAL
The pt was received on 1N, pleasant and cooperative, eager to get OOB and ambulate. The pt reported that he felt uncomfortable being in bed.

## 2017-04-02 LAB
ALBUMIN SERPL ELPH-MCNC: 1.7 G/DL — LOW (ref 3.3–5)
ALP SERPL-CCNC: 85 U/L — SIGNIFICANT CHANGE UP (ref 40–120)
ALT FLD-CCNC: 22 U/L — SIGNIFICANT CHANGE UP (ref 12–78)
ANION GAP SERPL CALC-SCNC: 11 MMOL/L — SIGNIFICANT CHANGE UP (ref 5–17)
ANION GAP SERPL CALC-SCNC: 13 MMOL/L — SIGNIFICANT CHANGE UP (ref 5–17)
AST SERPL-CCNC: 91 U/L — HIGH (ref 15–37)
BILIRUB SERPL-MCNC: 0.3 MG/DL — SIGNIFICANT CHANGE UP (ref 0.2–1.2)
BUN SERPL-MCNC: 14 MG/DL — SIGNIFICANT CHANGE UP (ref 7–23)
BUN SERPL-MCNC: 15 MG/DL — SIGNIFICANT CHANGE UP (ref 7–23)
CALCIUM SERPL-MCNC: 7.7 MG/DL — LOW (ref 8.5–10.1)
CALCIUM SERPL-MCNC: 7.8 MG/DL — LOW (ref 8.5–10.1)
CHLORIDE SERPL-SCNC: 106 MMOL/L — SIGNIFICANT CHANGE UP (ref 96–108)
CHLORIDE SERPL-SCNC: 109 MMOL/L — HIGH (ref 96–108)
CO2 SERPL-SCNC: 20 MMOL/L — LOW (ref 22–31)
CO2 SERPL-SCNC: 21 MMOL/L — LOW (ref 22–31)
CREAT SERPL-MCNC: 1.05 MG/DL — SIGNIFICANT CHANGE UP (ref 0.5–1.3)
CREAT SERPL-MCNC: 1.13 MG/DL — SIGNIFICANT CHANGE UP (ref 0.5–1.3)
GLUCOSE SERPL-MCNC: 78 MG/DL — SIGNIFICANT CHANGE UP (ref 70–99)
GLUCOSE SERPL-MCNC: 80 MG/DL — SIGNIFICANT CHANGE UP (ref 70–99)
HCT VFR BLD CALC: 28.3 % — LOW (ref 39–50)
HGB BLD-MCNC: 9.6 G/DL — LOW (ref 13–17)
LACTATE SERPL-SCNC: 1.1 MMOL/L — SIGNIFICANT CHANGE UP (ref 0.7–2)
MAGNESIUM SERPL-MCNC: 1.4 MG/DL — LOW (ref 1.8–2.4)
MCHC RBC-ENTMCNC: 33.8 GM/DL — SIGNIFICANT CHANGE UP (ref 32–36)
MCHC RBC-ENTMCNC: 35.4 PG — HIGH (ref 27–34)
MCV RBC AUTO: 104.6 FL — HIGH (ref 80–100)
PHOSPHATE SERPL-MCNC: 3.2 MG/DL — SIGNIFICANT CHANGE UP (ref 2.5–4.5)
PLATELET # BLD AUTO: 236 K/UL — SIGNIFICANT CHANGE UP (ref 150–400)
POTASSIUM SERPL-MCNC: 3.4 MMOL/L — LOW (ref 3.5–5.3)
POTASSIUM SERPL-MCNC: 3.5 MMOL/L — SIGNIFICANT CHANGE UP (ref 3.5–5.3)
POTASSIUM SERPL-SCNC: 3.4 MMOL/L — LOW (ref 3.5–5.3)
POTASSIUM SERPL-SCNC: 3.5 MMOL/L — SIGNIFICANT CHANGE UP (ref 3.5–5.3)
PROT SERPL-MCNC: 4.7 GM/DL — LOW (ref 6–8.3)
RBC # BLD: 2.7 M/UL — LOW (ref 4.2–5.8)
RBC # FLD: 17.5 % — HIGH (ref 10.3–14.5)
SODIUM SERPL-SCNC: 140 MMOL/L — SIGNIFICANT CHANGE UP (ref 135–145)
SODIUM SERPL-SCNC: 140 MMOL/L — SIGNIFICANT CHANGE UP (ref 135–145)
TROPONIN I SERPL-MCNC: <0.015 NG/ML — SIGNIFICANT CHANGE UP (ref 0.01–0.04)
WBC # BLD: 4.9 K/UL — SIGNIFICANT CHANGE UP (ref 3.8–10.5)
WBC # FLD AUTO: 4.9 K/UL — SIGNIFICANT CHANGE UP (ref 3.8–10.5)

## 2017-04-02 PROCEDURE — 99223 1ST HOSP IP/OBS HIGH 75: CPT

## 2017-04-02 PROCEDURE — 93010 ELECTROCARDIOGRAM REPORT: CPT

## 2017-04-02 RX ORDER — MORPHINE SULFATE 50 MG/1
2 CAPSULE, EXTENDED RELEASE ORAL ONCE
Qty: 0 | Refills: 0 | Status: DISCONTINUED | OUTPATIENT
Start: 2017-04-02 | End: 2017-04-02

## 2017-04-02 RX ORDER — ASPIRIN/CALCIUM CARB/MAGNESIUM 324 MG
325 TABLET ORAL ONCE
Qty: 0 | Refills: 0 | Status: COMPLETED | OUTPATIENT
Start: 2017-04-02 | End: 2017-04-02

## 2017-04-02 RX ORDER — SODIUM CHLORIDE 9 MG/ML
500 INJECTION INTRAMUSCULAR; INTRAVENOUS; SUBCUTANEOUS ONCE
Qty: 0 | Refills: 0 | Status: COMPLETED | OUTPATIENT
Start: 2017-04-02 | End: 2017-04-02

## 2017-04-02 RX ORDER — NITROGLYCERIN 6.5 MG
0.4 CAPSULE, EXTENDED RELEASE ORAL ONCE
Qty: 0 | Refills: 0 | Status: COMPLETED | OUTPATIENT
Start: 2017-04-02 | End: 2017-04-02

## 2017-04-02 RX ADMIN — SODIUM CHLORIDE 1000 MILLILITER(S): 9 INJECTION INTRAMUSCULAR; INTRAVENOUS; SUBCUTANEOUS at 21:39

## 2017-04-02 RX ADMIN — SODIUM CHLORIDE 1000 MILLILITER(S): 9 INJECTION INTRAMUSCULAR; INTRAVENOUS; SUBCUTANEOUS at 20:26

## 2017-04-02 RX ADMIN — MORPHINE SULFATE 2 MILLIGRAM(S): 50 CAPSULE, EXTENDED RELEASE ORAL at 22:38

## 2017-04-02 RX ADMIN — HEPARIN SODIUM 5000 UNIT(S): 5000 INJECTION INTRAVENOUS; SUBCUTANEOUS at 17:24

## 2017-04-02 RX ADMIN — HEPARIN SODIUM 5000 UNIT(S): 5000 INJECTION INTRAVENOUS; SUBCUTANEOUS at 05:07

## 2017-04-02 RX ADMIN — Medication 0.4 MILLIGRAM(S): at 20:17

## 2017-04-02 RX ADMIN — Medication 220 MILLIGRAM(S): at 12:41

## 2017-04-02 RX ADMIN — SODIUM CHLORIDE 100 MILLILITER(S): 9 INJECTION INTRAMUSCULAR; INTRAVENOUS; SUBCUTANEOUS at 02:06

## 2017-04-02 RX ADMIN — SODIUM CHLORIDE 1000 MILLILITER(S): 9 INJECTION INTRAMUSCULAR; INTRAVENOUS; SUBCUTANEOUS at 06:23

## 2017-04-02 RX ADMIN — MORPHINE SULFATE 2 MILLIGRAM(S): 50 CAPSULE, EXTENDED RELEASE ORAL at 20:17

## 2017-04-02 RX ADMIN — Medication 1 MILLIGRAM(S): at 12:41

## 2017-04-02 RX ADMIN — Medication 325 MILLIGRAM(S): at 20:17

## 2017-04-02 NOTE — PROVIDER CONTACT NOTE (OTHER) - RECOMMENDATIONS
Dr Parker states drainage looked clear, likely viral, no antibiotic gtts ordered. Also, aware of temp - cont Laine.
Further evaluation

## 2017-04-02 NOTE — CHART NOTE - NSCHARTNOTEFT_GEN_A_CORE
S: called by rn about pt with new onset chest pain. EKG ordered. Pt seen and examined at bedside.Reports sudden onset substernal chest pain that feels as an elephant is sitting on his chest. Pain is 9/10 and non-radiating and not associated with sob, N/V, dizziness or diaphoresis  O: 88/49, 74, 18, 100%  Gen: NAD  heart: S1, S2, RRR  Lung: CTA b/l  abd: soft, nt, nd x quads    EKG: sinus at 74 with PAC. previous EKG HR of 64    A/P: 71 year old male pt with new onset chest pain    - R/O ACS. PE unilikely- Wells score of 2.5    - Transfer to Tele  -trops stat  -cbc stat  -cmp stat  - mag and phos stat  -morphine 2 mg stat  -asa 325 mg stat  -nitro 0.4 mg stat  -oxygen 4 lpm via nc      Case discussed with S: called by rn about pt with new onset chest pain. EKG ordered. Pt seen and examined at bedside.Reports sudden onset substernal chest pain that feels as an elephant is sitting on his chest. Pain is 9/10 and non-radiating and not associated with sob, N/V, dizziness or diaphoresis  O: 88/49, 74, 18, 100%  Gen: NAD  heart: S1, S2, RRR  Lung: CTA b/l  abd: soft, nt, nd x quads    EKG: sinus at 74 with PAC. previous EKG HR of 64    A/P: 71 year old male pt with new onset chest pain    - R/O ACS. PE unilikely- Wells score of 2.5    - Transfer to Tele  -trops stat  -cbc stat  -cmp stat  - mag and phos stat  -morphine 2 mg stat  -asa 325 mg stat  -nitro 0.4 mg stat  -oxygen 4 lpm via nc  - lactate stat  - 1L NS bolus      Case discussed with Dr Ozuna

## 2017-04-02 NOTE — CHART NOTE - NSCHARTNOTEFT_GEN_A_CORE
Notified by RN with a blood pressure of 84/52,73,18,96%RA,97.3.    70 y/o male with Stage IV small cell lung cancer that presents with progressively worsening weakness, gait imbalance, and fatigue    As per the patient he doesn't have any fever,chills,N/V/D,abdominal pain,HA,CP,SOB he says her is tired and wants to be left alone.  On examination:resting comfortable  CVS:s1s2  Resp:ctal   Abd:soft and nontender  Ext:no edema    A/P  70 y/o male with Stage IV small cell lung cancer that presents with progressively worsening weakness, gait imbalance, and fatigue with hypotension  -Will give 500 mls of bolus and follow up with the blood pressure after it finishes.

## 2017-04-03 LAB
ALBUMIN SERPL ELPH-MCNC: 1.8 G/DL — LOW (ref 3.3–5)
ALP SERPL-CCNC: 90 U/L — SIGNIFICANT CHANGE UP (ref 40–120)
ALT FLD-CCNC: 26 U/L — SIGNIFICANT CHANGE UP (ref 12–78)
ANION GAP SERPL CALC-SCNC: 10 MMOL/L — SIGNIFICANT CHANGE UP (ref 5–17)
AST SERPL-CCNC: 96 U/L — HIGH (ref 15–37)
BILIRUB DIRECT SERPL-MCNC: 0.2 MG/DL — SIGNIFICANT CHANGE UP (ref 0–0.2)
BILIRUB INDIRECT FLD-MCNC: 0.2 MG/DL — SIGNIFICANT CHANGE UP (ref 0.2–1)
BILIRUB SERPL-MCNC: 0.4 MG/DL — SIGNIFICANT CHANGE UP (ref 0.2–1.2)
BUN SERPL-MCNC: 13 MG/DL — SIGNIFICANT CHANGE UP (ref 7–23)
CALCIUM SERPL-MCNC: 8 MG/DL — LOW (ref 8.5–10.1)
CHLORIDE SERPL-SCNC: 107 MMOL/L — SIGNIFICANT CHANGE UP (ref 96–108)
CO2 SERPL-SCNC: 22 MMOL/L — SIGNIFICANT CHANGE UP (ref 22–31)
CREAT SERPL-MCNC: 1.04 MG/DL — SIGNIFICANT CHANGE UP (ref 0.5–1.3)
FERRITIN SERPL-MCNC: 1740 NG/ML — HIGH (ref 30–400)
FOLATE SERPL-MCNC: 10.1 NG/ML — SIGNIFICANT CHANGE UP (ref 4.8–24.2)
GLUCOSE SERPL-MCNC: 103 MG/DL — HIGH (ref 70–99)
HCT VFR BLD CALC: 31.7 % — LOW (ref 39–50)
HCT VFR BLD CALC: 32 % — LOW (ref 39–50)
HGB BLD-MCNC: 10.6 G/DL — LOW (ref 13–17)
HGB BLD-MCNC: 10.9 G/DL — LOW (ref 13–17)
IRON SATN MFR SERPL: 160 UG/DL — SIGNIFICANT CHANGE UP (ref 45–165)
IRON SATN MFR SERPL: SIGNIFICANT CHANGE UP % (ref 16–55)
MCHC RBC-ENTMCNC: 33.3 GM/DL — SIGNIFICANT CHANGE UP (ref 32–36)
MCHC RBC-ENTMCNC: 34 GM/DL — SIGNIFICANT CHANGE UP (ref 32–36)
MCHC RBC-ENTMCNC: 35.7 PG — HIGH (ref 27–34)
MCHC RBC-ENTMCNC: 36.4 PG — HIGH (ref 27–34)
MCV RBC AUTO: 107 FL — HIGH (ref 80–100)
MCV RBC AUTO: 107.2 FL — HIGH (ref 80–100)
PLATELET # BLD AUTO: 219 K/UL — SIGNIFICANT CHANGE UP (ref 150–400)
PLATELET # BLD AUTO: 236 K/UL — SIGNIFICANT CHANGE UP (ref 150–400)
POTASSIUM SERPL-MCNC: 4.1 MMOL/L — SIGNIFICANT CHANGE UP (ref 3.5–5.3)
POTASSIUM SERPL-SCNC: 4.1 MMOL/L — SIGNIFICANT CHANGE UP (ref 3.5–5.3)
PROT SERPL-MCNC: 5.3 GM/DL — LOW (ref 6–8.3)
RBC # BLD: 2.96 M/UL — LOW (ref 4.2–5.8)
RBC # BLD: 2.99 M/UL — LOW (ref 4.2–5.8)
RBC # FLD: 16.9 % — HIGH (ref 10.3–14.5)
RBC # FLD: 17.5 % — HIGH (ref 10.3–14.5)
SODIUM SERPL-SCNC: 139 MMOL/L — SIGNIFICANT CHANGE UP (ref 135–145)
TIBC SERPL-MCNC: SIGNIFICANT CHANGE UP UG/DL (ref 220–430)
TROPONIN I SERPL-MCNC: 0.02 NG/ML — SIGNIFICANT CHANGE UP (ref 0.01–0.04)
TROPONIN I SERPL-MCNC: <0.015 NG/ML — SIGNIFICANT CHANGE UP (ref 0.01–0.04)
TSH SERPL-MCNC: 15 UU/ML — HIGH (ref 0.36–3.74)
UIBC SERPL-MCNC: <10 UG/DL — LOW (ref 110–370)
WBC # BLD: 5.4 K/UL — SIGNIFICANT CHANGE UP (ref 3.8–10.5)
WBC # BLD: 5.6 K/UL — SIGNIFICANT CHANGE UP (ref 3.8–10.5)
WBC # FLD AUTO: 5.4 K/UL — SIGNIFICANT CHANGE UP (ref 3.8–10.5)
WBC # FLD AUTO: 5.6 K/UL — SIGNIFICANT CHANGE UP (ref 3.8–10.5)

## 2017-04-03 PROCEDURE — 99232 SBSQ HOSP IP/OBS MODERATE 35: CPT

## 2017-04-03 PROCEDURE — 93306 TTE W/DOPPLER COMPLETE: CPT | Mod: 26

## 2017-04-03 RX ORDER — CIPROFLOXACIN HCL 0.3 %
2 DROPS OPHTHALMIC (EYE) EVERY 4 HOURS
Qty: 0 | Refills: 0 | Status: DISCONTINUED | OUTPATIENT
Start: 2017-04-03 | End: 2017-04-04

## 2017-04-03 RX ADMIN — Medication 2 DROP(S): at 17:24

## 2017-04-03 RX ADMIN — Medication 1 MILLIGRAM(S): at 12:31

## 2017-04-03 RX ADMIN — SODIUM CHLORIDE 100 MILLILITER(S): 9 INJECTION INTRAMUSCULAR; INTRAVENOUS; SUBCUTANEOUS at 17:16

## 2017-04-03 RX ADMIN — Medication 2 DROP(S): at 22:47

## 2017-04-03 RX ADMIN — HEPARIN SODIUM 5000 UNIT(S): 5000 INJECTION INTRAVENOUS; SUBCUTANEOUS at 17:24

## 2017-04-04 VITALS
OXYGEN SATURATION: 95 % | SYSTOLIC BLOOD PRESSURE: 88 MMHG | TEMPERATURE: 95 F | DIASTOLIC BLOOD PRESSURE: 52 MMHG | HEART RATE: 87 BPM | RESPIRATION RATE: 20 BRPM

## 2017-04-04 DIAGNOSIS — R09.02 HYPOXEMIA: ICD-10-CM

## 2017-04-04 LAB
ANION GAP SERPL CALC-SCNC: 13 MMOL/L — SIGNIFICANT CHANGE UP (ref 5–17)
BUN SERPL-MCNC: 11 MG/DL — SIGNIFICANT CHANGE UP (ref 7–23)
CALCIUM SERPL-MCNC: 7.9 MG/DL — LOW (ref 8.5–10.1)
CHLORIDE SERPL-SCNC: 109 MMOL/L — HIGH (ref 96–108)
CO2 SERPL-SCNC: 19 MMOL/L — LOW (ref 22–31)
CREAT SERPL-MCNC: 0.88 MG/DL — SIGNIFICANT CHANGE UP (ref 0.5–1.3)
FOLATE SERPL-MCNC: 11.3 NG/ML — SIGNIFICANT CHANGE UP (ref 4.8–24.2)
GLUCOSE SERPL-MCNC: 76 MG/DL — SIGNIFICANT CHANGE UP (ref 70–99)
HCT VFR BLD CALC: 29.4 % — LOW (ref 39–50)
HGB BLD-MCNC: 10.1 G/DL — LOW (ref 13–17)
MCHC RBC-ENTMCNC: 34.2 GM/DL — SIGNIFICANT CHANGE UP (ref 32–36)
MCHC RBC-ENTMCNC: 36.3 PG — HIGH (ref 27–34)
MCV RBC AUTO: 106.1 FL — HIGH (ref 80–100)
PLATELET # BLD AUTO: 197 K/UL — SIGNIFICANT CHANGE UP (ref 150–400)
POTASSIUM SERPL-MCNC: 3.3 MMOL/L — LOW (ref 3.5–5.3)
POTASSIUM SERPL-SCNC: 3.3 MMOL/L — LOW (ref 3.5–5.3)
RBC # BLD: 2.77 M/UL — LOW (ref 4.2–5.8)
RBC # FLD: 17.2 % — HIGH (ref 10.3–14.5)
SODIUM SERPL-SCNC: 141 MMOL/L — SIGNIFICANT CHANGE UP (ref 135–145)
VIT B12 SERPL-MCNC: >2000 PG/ML — HIGH (ref 243–894)
VIT B12 SERPL-MCNC: >2000 PG/ML — HIGH (ref 243–894)
WBC # BLD: 6.1 K/UL — SIGNIFICANT CHANGE UP (ref 3.8–10.5)
WBC # FLD AUTO: 6.1 K/UL — SIGNIFICANT CHANGE UP (ref 3.8–10.5)

## 2017-04-04 PROCEDURE — 71275 CT ANGIOGRAPHY CHEST: CPT | Mod: 26

## 2017-04-04 PROCEDURE — 93010 ELECTROCARDIOGRAM REPORT: CPT

## 2017-04-04 RX ORDER — THIAMINE MONONITRATE (VIT B1) 100 MG
500 TABLET ORAL THREE TIMES A DAY
Qty: 0 | Refills: 0 | Status: DISCONTINUED | OUTPATIENT
Start: 2017-04-04 | End: 2017-04-04

## 2017-04-04 RX ORDER — DEXTROSE 50 % IN WATER 50 %
25 SYRINGE (ML) INTRAVENOUS THREE TIMES A DAY
Qty: 0 | Refills: 0 | Status: DISCONTINUED | OUTPATIENT
Start: 2017-04-04 | End: 2017-04-04

## 2017-04-04 RX ORDER — ENOXAPARIN SODIUM 100 MG/ML
70 INJECTION SUBCUTANEOUS ONCE
Qty: 0 | Refills: 0 | Status: DISCONTINUED | OUTPATIENT
Start: 2017-04-04 | End: 2017-04-04

## 2017-04-04 RX ORDER — POTASSIUM CHLORIDE 20 MEQ
40 PACKET (EA) ORAL ONCE
Qty: 0 | Refills: 0 | Status: DISCONTINUED | OUTPATIENT
Start: 2017-04-04 | End: 2017-04-04

## 2017-04-04 RX ADMIN — SODIUM CHLORIDE 100 MILLILITER(S): 9 INJECTION INTRAMUSCULAR; INTRAVENOUS; SUBCUTANEOUS at 03:03

## 2017-04-04 RX ADMIN — Medication 2 DROP(S): at 05:58

## 2017-04-04 RX ADMIN — Medication 2 DROP(S): at 15:19

## 2017-04-04 RX ADMIN — HEPARIN SODIUM 5000 UNIT(S): 5000 INJECTION INTRAVENOUS; SUBCUTANEOUS at 05:58

## 2017-04-04 RX ADMIN — Medication 2 DROP(S): at 10:50

## 2017-04-04 RX ADMIN — Medication 1 MILLIGRAM(S): at 11:34

## 2017-04-04 RX ADMIN — Medication 105 MILLIGRAM(S): at 15:19

## 2017-04-04 RX ADMIN — Medication 2 DROP(S): at 03:02

## 2017-04-04 NOTE — PROVIDER CONTACT NOTE (CHANGE IN STATUS NOTIFICATION) - ASSESSMENT
Pt 74% oxygen saturation on RA. Placed on 5 L NC oxygen saturation to 86%. Placed on 50% VM oxygen saturation to 96%.
Pt is agitated and confused. BP 90/55. HR now in the 70's.
Pt lethargic, c/o chest pain, crushing 10/10, alert to person only

## 2017-04-04 NOTE — PROGRESS NOTE ADULT - PROBLEM SELECTOR PLAN 2
as above heme/onc following and recommendations appreciated
as above heme/onc following and recommendations appreciated  No tx planned for now  Responded well to cyberknife/rtx/chemo

## 2017-04-04 NOTE — PROVIDER CONTACT NOTE (OTHER) - SITUATION
Called consult for Dr. Flynn.  Spoke with Priscilla at service.
informed dr. craig of the low urine production. we straight cathed the pt and got out what the bladder scanner read 450 ml mj urine
Pt aggressive and combative. Refuses to keep oxygen mask on and refuses evaluation.
Pt status not improved despite interventions.
Spoke with Dr. Ordaz
BP 84/52 all other VSS
Pt lost pulse. Code blue initiated. CPR initiated.
Pts eyes reddened wit drainage. Rectal temp 94.2 and is placed back on the Laine hugger.
RN made aware by telemetry tech patient bradycardic 40's and hypoxic oxygen 70's.
calling in a consult for neurology
notified md of all of the above
pt hs not been producing a lot of urine. I tried to call dr. ravi about this. I informed dr. craig earlier. spoke with him again to let him know that the pt has not urinated
pt is not producing a lot of urine. he has 389 in his bladder. pt is on the keeley hugger will continue to monitor for the pt to urinate
pt is seeing things that arent there
pts blood pressure is low, pt is alert and oriented, able to answer questions and neuro check is normal

## 2017-04-04 NOTE — CHART NOTE - NSCHARTNOTEFT_GEN_A_CORE
70 y/o patient with metastatic non small cell lung cancer  that presents to  with complaints of worsening weakness/ dizziness/ ataxia.Patient has history of Metastatic adenocarcinoma of lung. Presented in July 2016 with extensive brain mets, left hilar mass, adenopathy, adrenal mets, retroperitoneal adenopathy , extensive bone mets, abdominal carcinomatosis. He is s/p cyberknife RT to brain mets, RT to thoracic spine.  S/p multiple courses of chemotherapy with good response.  Currently on maintenance with Alimta and avastin- last given on March 10.  CT chest, abdomen and pelvis March7, 2017- ongoing response, continuous improvement. MRI of brain January 2017- response/ improvement in brain mets. During the course of his hospitalization patient had abrupt change in his mental status which was presumed to be secondary to etoh abuse and withdrawal. An urgent CTPE with 2 physician administrative consent was obtained for concern for possible embolus given his new onset hypoxemia.     CTPE: No evidence of acute pulmonary embolism     Near complete obstruction of the left mainstem bronchus and segmental   bronchi with mucosal debris with additional mucoid debris within the   trachea and right mainstem bronchus.     Interval development of mild to moderate bilateral pleural effusions   right greater than left with interval development of segmental   compressive atelectasis in the left greater than right lower lobes.   Additional subsegmental atelectasis in the posterior left upper lobe and   lingula as well as the medial right upper lobe. Mild superimposed   pulmonary edema.    Delineation of the previously noted left hilar and left lower lobe mass   difficult given the degree of atelectasis in left lung base.    Improving bilateral adrenal metastasis.    Multiple bony metastasis again seen    Areas of nonpacification within the right internal jugular vein and   superior vena cava likely secondary to missing artifact however if   concern for venous thrombosis, ultrasound can be obtained        Given his rapid decline is hemodynamics and new findings on CT which include progression and complete abutting of L mainstain bronchus and acute hypoxemic respiratory failure with metastatic disease, it was deemed futile to continue any cardiopulmonary respiratory efforts as it would not change outcome based on new CT findings and prognosis. Case d/w oncology who agreed to the same. All other efforts would be futile. Patient was formally made DNR/DNI.   Code blue called at 1559HRS. Time of death: 1609Hrs. Pronounced by Dr Parker

## 2017-04-04 NOTE — DISCHARGE NOTE FOR THE EXPIRED PATIENT - HOSPITAL COURSE
72 y/o patient with metastatic non small cell lung cancer  that presents to  with complaints of worsening weakness/ dizziness/ ataxia.Patient has history of Metastatic adenocarcinoma of lung. Presented in July 2016 with extensive brain mets, left hilar mass, adenopathy, adrenal mets, retroperitoneal adenopathy , extensive bone mets, abdominal carcinomatosis. He is s/p cyberknife RT to brain mets, RT to thoracic spine.  S/p multiple courses of chemotherapy with good response.  Currently on maintenance with Alimta and avastin- last given on March 10.  CT chest, abdomen and pelvis March7, 2017- ongoing response, continuous improvement. MRI of brain January 2017- response/ improvement in brain mets. During the course of his hospitalization patient had abrupt change in his mental status which was presumed to be secondary to etoh abuse and withdrawal. An urgent CTPE with 2 physician administrative consent was obtained for concern for possible embolus given his new onset hypoxemia.     CTPE: No evidence of acute pulmonary embolism     Near complete obstruction of the left mainstem bronchus and segmental   bronchi with mucosal debris with additional mucoid debris within the   trachea and right mainstem bronchus.     Interval development of mild to moderate bilateral pleural effusions   right greater than left with interval development of segmental   compressive atelectasis in the left greater than right lower lobes.   Additional subsegmental atelectasis in the posterior left upper lobe and   lingula as well as the medial right upper lobe. Mild superimposed   pulmonary edema.    Delineation of the previously noted left hilar and left lower lobe mass   difficult given the degree of atelectasis in left lung base.    Improving bilateral adrenal metastasis.    Multiple bony metastasis again seen    Areas of nonpacification within the right internal jugular vein and   superior vena cava likely secondary to missing artifact however if   concern for venous thrombosis, ultrasound can be obtained    Given his rapid decline is hemodynamics and new findings on CT which include progression and complete abutting of L mainstain bronchus and acute hypoxemic respiratory failure with metastatic disease, it was deemed futile to continue any cardiopulmonary respiratory efforts as it would not change outcome based on new CT findings and prognosis. Case d/w oncology who agreed to the same. All other efforts would be futile. Patient was formally made DNR/DNI.   Code blue called at 1559HRS. Time of death: 1609Hrs. Pronounced by Dr Parker.

## 2017-04-04 NOTE — PROVIDER CONTACT NOTE (OTHER) - BACKGROUND
Metastatic lung ca.
admitted with dehydration  history of metastatic lung CA  Has had low BP since admission received a bolus yesterday for low BP
hallucinations/ataxia etc
pt has been intermittently confused. pt had an mri this morning, results were reviewed
pt has had low blood pressure since being admitted but the latest is 78/54 on the left arm
Pt with metastatic cancer. Pt full code with no known next of kin or contact point.

## 2017-04-04 NOTE — PROGRESS NOTE ADULT - PROBLEM SELECTOR PLAN 6
likely secondary to liver metasteses   no acute management for now
likely secondary to liver metasteses   ETOH related. may also have etoh hepatitis  repeat enzymes and acute hep panel
likely secondary to liver metasteses   no acute management for now
likely secondary to liver metasteses   no acute management for now

## 2017-04-04 NOTE — PROGRESS NOTE ADULT - PROBLEM SELECTOR PROBLEM 7
Anemia in neoplastic disease

## 2017-04-04 NOTE — PROGRESS NOTE ADULT - PROBLEM SELECTOR PLAN 1
r/o progression of brain metastses   MRI head results pending-> eval for progression of recurrence of metastatic lesions  May need MRI of spine  PT consult for gait training  Heme/Onc Consult  pending results of MRI may consider Rad/onc reconsult with Dr. Morris
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
r/o progression of brain metastses   MRI head results pending-> neg for progression of recurrence of metastatic lesions  Recent RTX of head and spine  Poss RTX encephalopathy vs PNS  PT consult for gait training
r/o progression of brain metastses   MRI head results pending-> neg for progression of recurrence of metastatic lesions  Recent RTX of head and spine  Poss RTX encephalopathy vs PNS  Poss LP in am  F/u with neuro  PT consult for gait training

## 2017-04-04 NOTE — PROGRESS NOTE ADULT - PROBLEM SELECTOR PLAN 3
as above,  seizure precautions   Likely having delerium secondary to RTX encepahlopathy.  No indication for LP as no signs s for infections and low suspiscion for PNS
as above,  seizure precautions   neuro checks
as above,  seizure precautions   No indication for LP as no signs s for infections and low suspicion for PNS
as above,  seizure precautions   neuro checks

## 2017-04-04 NOTE — PROGRESS NOTE ADULT - PROBLEM SELECTOR PROBLEM 2
Malignant neoplasm of lung, unspecified laterality, unspecified part of lung

## 2017-04-04 NOTE — PROGRESS NOTE ADULT - PROBLEM SELECTOR PLAN 5
likely 2/2 to prerenal azotemia  gentle IVF as above  recheck bun/cr prior to MRI
likely 2/2 to prerenal azotemia  gentle IVF as above
likely 2/2 to prerenal azotemia  gentle IVF as above  recheck bun/cr prior to MRI
likely 2/2 to prerenal azotemia  gentle IVF as above  recheck bun/cr prior to MRI

## 2017-04-04 NOTE — CHART NOTE - NSCHARTNOTEFT_GEN_A_CORE
Asked by Dr. Parker to assist in the management of this patient.    Patient is unable to make medical decisions for himself due to his impaired mental status and encephalopathy.  Has Hx of metastatic adenocarcinoma of the lung.  Patient has no HCP, no relatives, no family, and no available surrogate HCP.  He developed crushing chest pain, hypotension and hypoxia today.  The treating physician, Dr. Parker, is concerned about a life-threatening pulmonary embolus with his symptoms.  She ordered a stat CTA of the chest with IV contrast.  Patient is unable to consent for the IV contrast.  Urgent 2 physician consent obtained by myself and Dr. Parker for the urgent evaluation for a possible life-threatening condition with clinical concern for possible PE in patient with active cancer.  Consent for CTA with IV contrast signed by both me and Dr. Parker.

## 2017-04-04 NOTE — PROGRESS NOTE ADULT - SUBJECTIVE AND OBJECTIVE BOX
Events from weekend noted. MRI of brain- no evidence of progression. Evaluated by neurology.    Sitting in the chair in hospital hallway. Oriented to person, place, but overall appears more confused than last week.   Denies pain or discomfort.     Vital Signs Last 24 Hrs  T(C): 36, Max: 36.7 (04-03 @ 10:53)  T(F): 96.8, Max: 98.1 (04-03 @ 10:53)  HR: 71 (63 - 80)  BP: 98/60 (81/54 - 107/64)  BP(mean): --  RR: 18 (18 - 18)  SpO2: 92% (92% - 100%)    MEDICATIONS  (STANDING):  heparin  Injectable 5000Unit(s) SubCutaneous every 12 hours  folic acid 1milliGRAM(s) Oral daily  sodium chloride 0.9%. 1000milliLiter(s) IV Continuous <Continuous>  ciprofloxacin  0.3% Ophthalmic Solution 2Drop(s) Right EYE every 4 hours    MEDICATIONS  (PRN):  acetaminophen   Tablet 650milliGRAM(s) Oral every 6 hours PRN For Temp greater than 38 C (100.4 F)  acetaminophen   Tablet. 650milliGRAM(s) Oral every 6 hours PRN Mild Pain (1 - 3)  senna 2Tablet(s) Oral at bedtime PRN Constipation
No significant change.    awake, oriented to date, person and place.  follows commands, flat affect  no change in nystagmus in gaze direction  power 5/5  tone normal    WBC 5.4      IMP:  Encephalopathy, probably multifactorial in patient with h/o lung mets to brain.  Would r/o vit B12 deficiency due to elevated MCV  case d/w Dr. Parker, as the presentation is very atypical for paraneoplastic syndrome in setting of lung CA, will hold off on LP for now.
Patient is a 71y old  Male who presents with a chief complaint of I'm having problems with my equilibrium (31 Mar 2017 20:18)      SUBJECTIVE:   HPI:  70 y/o patient with metastatic non small cell lung cancer  that presents to  with complaints of worsening weakness/ dizziness/ ataxia.Patient has history of Metastatic adenocarcinoma of lung. Presented in July 2016 with extensive brain mets, left hilar mass, adenopathy, adrenal mets, retroperitoneal adenopathy , extensive bone mets, abdominal carcinomatosis. He is s/p cyberknife RT to brain mets, RT to thoracic spine.  S/p multiple courses of chemotherapy with good response.  Currently on maintenance with Alimta and avastin- last given on March 10.  CT chest, abdomen and pelvis March7, 2017- ongoing response, continuous improvement. MRI of brain January 2017- response/ improvement in brain mets.  Had intermittent dizziness, poor equilibrium on and off x several months - now progressively worse. He states that he has also had weight loss, poor appetite, and decreased energy such that he is unable to perform ADL's such as bathing or preparing food.  Lives alone at home. No family, no friends, no community support.     4/1: still complains of weakness to b/l LE. denies any urine or bowel incontinenece. wekaness and poor oral intake has been issue for several weeks more so since last chemo 3 weeks ago. hypothermic since last night to 94F. Bare hugger applied. now 97 without. ROS neg for any nidus of infection.   4/2: sitting in hallway for safety, patient alert and oriented to self and place.         REVIEW OF SYSTEMS:    CONSTITUTIONAL: No weakness, fevers or chills  EYES/ENT: No visual changes;  No vertigo or throat pain   NECK: No pain or stiffness  RESPIRATORY: No cough, wheezing, hemoptysis; No shortness of breath  CARDIOVASCULAR: No chest pain or palpitations  GASTROINTESTINAL: No abdominal or epigastric pain. No nausea, vomiting, or hematemesis; No diarrhea or constipation. No melena or hematochezia.  GENITOURINARY: No dysuria, frequency or hematuria  NEUROLOGICAL: No numbness or weakness  SKIN: No itching, burning, rashes, or lesions   All other review of systems is negative unless indicated above    Height (cm): 172.7 (03-31 @ 21:09)  Weight (kg): 73.4 (03-31 @ 21:09)  BMI (kg/m2): 24.6 (03-31 @ 21:09)  BSA (m2): 1.87 (03-31 @ 21:09)    ICU Vital Signs Last 24 Hrs  T(C): 36.2, Max: 36.3 (04-01 @ 20:38)  T(F): 97.2, Max: 97.3 (04-01 @ 20:38)  HR: 71 (70 - 81)  BP: 107/61 (78/54 - 110/60)  BP(mean): --  ABP: --  ABP(mean): --  RR: 18 (18 - 18)  SpO2: 98% (96% - 99%)      I&O's Summary  I & Os for 24h ending 01 Apr 2017 07:00  =============================================  IN: 882 ml / OUT: 0 ml / NET: 882 ml    I & Os for current day (as of 01 Apr 2017 11:42)  =============================================  IN: 0 ml / OUT: 90 ml / NET: -90 ml      CAPILLARY BLOOD GLUCOSE      PHYSICAL EXAM:    Constitutional: NAD, awake and alert, well-developed  HEENT: PERR, EOMI, Normal Hearing, MMM, b/l eye with clear discharge  Neck: Soft and supple, No LAD, No JVD  Respiratory: Breath sounds are clear bilaterally, No wheezing, rales or rhonchi  Cardiovascular: S1 and S2, regular rate and rhythm, no Murmurs, gallops or rubs  Gastrointestinal: Bowel Sounds present, soft, nontender, nondistended, no guarding, no rebound  Extremities: No peripheral edema  Vascular: 2+ peripheral pulses  Neurological: A/O x 3, no focal deficits  Musculoskeletal: 5/5 strength b/l upper and lower extremities  Skin: No rashes    MEDICATIONS:  MEDICATIONS  (STANDING):  heparin  Injectable 5000Unit(s) SubCutaneous every 12 hours  lactated ringers. 1000milliLiter(s) IV Continuous <Continuous>  folic acid 1milliGRAM(s) Oral daily  magnesium oxide 400milliGRAM(s) Oral once      LABS: All Labs Reviewed:                        10.4   4.2   )-----------( 240      ( 01 Apr 2017 05:54 )             31.4     01 Apr 2017 05:54    136    |  101    |  14     ----------------------------<  69     3.6     |  20     |  1.17     Ca    7.9        01 Apr 2017 05:54  Phos  3.3       01 Apr 2017 05:54  Mg     1.5       01 Apr 2017 05:54    TPro  6.7    /  Alb  2.6    /  TBili  0.4    /  DBili  x      /  AST  139    /  ALT  32     /  AlkPhos  125    31 Mar 2017 12:45    PT/INR - ( 31 Mar 2017 12:45 )   PT: 12.8 sec;   INR: 1.18 ratio         PTT - ( 31 Mar 2017 12:45 )  PTT:53.3 sec          Blood Culture:     RADIOLOGY/EKG:
Patient is a 71y old  Male who presents with a chief complaint of I'm having problems with my equilibrium (31 Mar 2017 20:18)      SUBJECTIVE:   HPI:  72 y/o patient with metastatic non small cell lung cancer  that presents to  with complaints of worsening weakness/ dizziness/ ataxia.Patient has history of Metastatic adenocarcinoma of lung. Presented in July 2016 with extensive brain mets, left hilar mass, adenopathy, adrenal mets, retroperitoneal adenopathy , extensive bone mets, abdominal carcinomatosis. He is s/p cyberknife RT to brain mets, RT to thoracic spine.  S/p multiple courses of chemotherapy with good response.  Currently on maintenance with Alimta and avastin- last given on March 10.  CT chest, abdomen and pelvis March7, 2017- ongoing response, continuous improvement. MRI of brain January 2017- response/ improvement in brain mets.  Had intermittent dizziness, poor equilibrium on and off x several months - now progressively worse. He states that he has also had weight loss, poor appetite, and decreased energy such that he is unable to perform ADL's such as bathing or preparing food.  Lives alone at home. No family, no friends, no community support.     4/1: still complains of weakness to b/l LE. denies any urine or bowel incontinenece. weakness and poor oral intake has been issue for several weeks more so since last chemo 3 weeks ago. hypothermic since last night to 94F. Bare hugger applied. now 97 without. ROS neg for any nidus of infection.   4/2: sitting in hallway for safety, patient alert and oriented to self and place.   4/3: alert and oriented to self and year.        REVIEW OF SYSTEMS:    CONSTITUTIONAL: No weakness, fevers or chills  EYES/ENT: No visual changes;  No vertigo or throat pain   NECK: No pain or stiffness  RESPIRATORY: No cough, wheezing, hemoptysis; No shortness of breath  CARDIOVASCULAR: No chest pain or palpitations  GASTROINTESTINAL: No abdominal or epigastric pain. No nausea, vomiting, or hematemesis; No diarrhea or constipation. No melena or hematochezia.  GENITOURINARY: No dysuria, frequency or hematuria  NEUROLOGICAL: No numbness or weakness  SKIN: No itching, burning, rashes, or lesions   All other review of systems is negative unless indicated above    Height (cm): 172.7 (03-31 @ 21:09)  Weight (kg): 73.4 (03-31 @ 21:09)  BMI (kg/m2): 24.6 (03-31 @ 21:09)  BSA (m2): 1.87 (03-31 @ 21:09)    ICU Vital Signs Last 24 Hrs  T(C): 36.2, Max: 36.3 (04-01 @ 20:38)  T(F): 97.2, Max: 97.3 (04-01 @ 20:38)  HR: 71 (70 - 81)  BP: 107/61 (78/54 - 110/60)  BP(mean): --  ABP: --  ABP(mean): --  RR: 18 (18 - 18)  SpO2: 98% (96% - 99%)      I&O's Summary  I & Os for 24h ending 01 Apr 2017 07:00  =============================================  IN: 882 ml / OUT: 0 ml / NET: 882 ml    I & Os for current day (as of 01 Apr 2017 11:42)  =============================================  IN: 0 ml / OUT: 90 ml / NET: -90 ml      CAPILLARY BLOOD GLUCOSE      PHYSICAL EXAM:    Constitutional: NAD, awake and alert, well-developed  HEENT: PERR, EOMI, Normal Hearing, MMM, b/l eye with clear discharge  Neck: Soft and supple, No LAD, No JVD  Respiratory: Breath sounds are clear bilaterally, No wheezing, rales or rhonchi  Cardiovascular: S1 and S2, regular rate and rhythm, no Murmurs, gallops or rubs  Gastrointestinal: Bowel Sounds present, soft, nontender, nondistended, no guarding, no rebound  Extremities: No peripheral edema  Vascular: 2+ peripheral pulses  Neurological: A/O x 3, no focal deficits  Musculoskeletal: 5/5 strength b/l upper and lower extremities  Skin: No rashes    MEDICATIONS:  MEDICATIONS  (STANDING):  heparin  Injectable 5000Unit(s) SubCutaneous every 12 hours  lactated ringers. 1000milliLiter(s) IV Continuous <Continuous>  folic acid 1milliGRAM(s) Oral daily  magnesium oxide 400milliGRAM(s) Oral once      LABS: All Labs Reviewed:                        10.4   4.2   )-----------( 240      ( 01 Apr 2017 05:54 )             31.4     01 Apr 2017 05:54    136    |  101    |  14     ----------------------------<  69     3.6     |  20     |  1.17     Ca    7.9        01 Apr 2017 05:54  Phos  3.3       01 Apr 2017 05:54  Mg     1.5       01 Apr 2017 05:54    TPro  6.7    /  Alb  2.6    /  TBili  0.4    /  DBili  x      /  AST  139    /  ALT  32     /  AlkPhos  125    31 Mar 2017 12:45    PT/INR - ( 31 Mar 2017 12:45 )   PT: 12.8 sec;   INR: 1.18 ratio         PTT - ( 31 Mar 2017 12:45 )  PTT:53.3 sec          Blood Culture:     RADIOLOGY/EKG:
Patient is a 71y old  Male who presents with a chief complaint of I'm having problems with my equilibrium (31 Mar 2017 20:18)      SUBJECTIVE:   HPI:  72 y/o patient with metastatic non small cell lung cancer  that presents to  with complaints of worsening weakness/ dizziness/ ataxia.Patient has history of Metastatic adenocarcinoma of lung. Presented in July 2016 with extensive brain mets, left hilar mass, adenopathy, adrenal mets, retroperitoneal adenopathy , extensive bone mets, abdominal carcinomatosis. He is s/p cyberknife RT to brain mets, RT to thoracic spine.  S/p multiple courses of chemotherapy with good response.  Currently on maintenance with Alimta and avastin- last given on March 10.  CT chest, abdomen and pelvis March7, 2017- ongoing response, continuous improvement. MRI of brain January 2017- response/ improvement in brain mets.  Had intermittent dizziness, poor equilibrium on and off x several months - now progressively worse. He states that he has also had weight loss, poor appetite, and decreased energy such that he is unable to perform ADL's such as bathing or preparing food.  Lives alone at home. No family, no friends, no community support.     4/1: still complains of weakness to b/l LE. denies any urine or bowel incontinenece. weakness and poor oral intake has been issue for several weeks more so since last chemo 3 weeks ago. hypothermic since last night to 94F. Bare hugger applied. now 97 without. ROS neg for any nidus of infection.   4/2: sitting in hallway for safety, patient alert and oriented to self and place.   4/3: alert and oriented to self and year.  4/4: presently confused and combative. Hypoxemic to 70s, NRB placed, hypotensive to 80-90s. complains of crushing chest pain.   Noteably MCV elevated. Per old documents with Dr An, patient had remote hx of ETOH use unknown quantity and last drink also unknown. Dr An does not have any demographical info regarding any NOK or friends. D/W Dr Baer. PT underwent cyberknife/RTX chemo and repsonded well based on recent MRI in Jan/17. Based on MRI here, there appears to be no sign of disease recurrence or progression.       REVIEW OF SYSTEMS:    CONSTITUTIONAL: No weakness, fevers or chills  EYES/ENT: No visual changes;  No vertigo or throat pain   NECK: No pain or stiffness  RESPIRATORY: No cough, wheezing, hemoptysis; No shortness of breath  CARDIOVASCULAR: No chest pain or palpitations  GASTROINTESTINAL: No abdominal or epigastric pain. No nausea, vomiting, or hematemesis; No diarrhea or constipation. No melena or hematochezia.  GENITOURINARY: No dysuria, frequency or hematuria  NEUROLOGICAL: No numbness or weakness  SKIN: No itching, burning, rashes, or lesions   All other review of systems is negative unless indicated above    Height (cm): 172.7 (03-31 @ 21:09)  Weight (kg): 73.4 (03-31 @ 21:09)  BMI (kg/m2): 24.6 (03-31 @ 21:09)  BSA (m2): 1.87 (03-31 @ 21:09)    ICU Vital Signs Last 24 Hrs  T(C): 35.1, Max: 36 (04-03 @ 16:18)  T(F): 95.2, Max: 96.8 (04-03 @ 16:18)  HR: 82 (69 - 82)  BP: 88/52 (88/52 - 98/60)  BP(mean): --  ABP: --  ABP(mean): --  RR: 22 (18 - 22)  SpO2: 95% (74% - 96%)        I&O's Summary  I & Os for 24h ending 01 Apr 2017 07:00  =============================================  IN: 882 ml / OUT: 0 ml / NET: 882 ml    I & Os for current day (as of 01 Apr 2017 11:42)  =============================================  IN: 0 ml / OUT: 90 ml / NET: -90 ml      CAPILLARY BLOOD GLUCOSE      PHYSICAL EXAM:    Constitutional confused and combative, 4 pt restraints applied  HEENT: PERR, EOMI, Normal Hearing, MMM, b/l eye with clear discharge  Neck: Soft and supple, No LAD, No JVD  Respiratory: will not allow for full exam, appears to be in no resp distress at present, not utilizing accessory muscles  Cardiovascular: S1 and S2, regular rate and rhythm, no Murmurs, gallops or rubs  Gastrointestinal: Bowel Sounds present, soft, nontender, nondistended, no guarding, no rebound  Extremities: No peripheral edema  Vascular: 2+ peripheral pulses  Neurological: unable to assess  Musculoskeletal: 5/5 strength b/l upper and lower extremities  Skin: No rashes    MEDICATIONS:  MEDICATIONS  (STANDING):  heparin  Injectable 5000Unit(s) SubCutaneous every 12 hours  lactated ringers. 1000milliLiter(s) IV Continuous <Continuous>  folic acid 1milliGRAM(s) Oral daily  magnesium oxide 400milliGRAM(s) Oral once      LABS: All Labs Reviewed:                        10.4   4.2   )-----------( 240      ( 01 Apr 2017 05:54 )             31.4     01 Apr 2017 05:54    136    |  101    |  14     ----------------------------<  69     3.6     |  20     |  1.17     Ca    7.9        01 Apr 2017 05:54  Phos  3.3       01 Apr 2017 05:54  Mg     1.5       01 Apr 2017 05:54    TPro  6.7    /  Alb  2.6    /  TBili  0.4    /  DBili  x      /  AST  139    /  ALT  32     /  AlkPhos  125    31 Mar 2017 12:45    PT/INR - ( 31 Mar 2017 12:45 )   PT: 12.8 sec;   INR: 1.18 ratio         PTT - ( 31 Mar 2017 12:45 )  PTT:53.3 sec          Blood Culture:     RADIOLOGY/EKG:
Well known to me 70 y/o patient with metastatic non small cell lung cancer admitted with weakness/ dizziness/ ataxia.  For details of history please see copy of his recent office visit note from 3/8/17 ( copy in hospital chart).    Metastatic adenoca of lung. Presented in July 2016 with extensive brain mets, left hilar mass, adenopathy, adrenal mets, retroperitoneal adenopathy , extensive bone mets, abdominal carcinomatosis. S/p cyberknife RT to brain mets, RT to thoracic spine.   S/p multiple courses of chemotherapy- responded.   Currently on maintenance with Alimta and avastin- last given on March 10.  CT chest, abdomen and pelvis March7, 2017- ongoing response, continuous improvement.  MRI of brain January 2017- response/ improvement in brain mets.    Had intermittent dizziness, poor equilibrium on and off x several months - now progressively worse.   Lives alone at home. No family, no friends, no community support.      Vital Signs Last 24 Hrs  T(C): --  T(F): --  HR: 71 (67 - 76)  BP: 108/83 (80/65 - 108/83)  BP(mean): --  RR: 19 (16 - 19)  SpO2: 94% (94% - 100%)    On exam : awake alert speech slightly slow, difficulty finding words. slight ataxia ( Finger to nose)   chest clear. Heart regular. Abdomen soft                             13.0   4.8   )-----------( 239      ( 31 Mar 2017 12:45 )             38.4   31 Mar 2017 12:45    135    |  96     |  15     ----------------------------<  79     3.1     |  25     |  1.54     Ca    9.2        31 Mar 2017 12:45    TPro  6.7    /  Alb  2.6    /  TBili  0.4    /  DBili  x      /  AST  139    /  ALT  32     /  AlkPhos  125    31 Mar 2017 12:45    CT brain noncontrast- no acute findings
pt comfortable remains weak  had been coming on slowly  no new fevers/rigors  N/V/D    Lungs decreased BS bilat  Cor S1S2   abd: snt bs+    Impress  NSC lung Ca stage 4  on maint chemo Alimta/  Failing in general ; feeling weaker  etiology unclear  Continue fluids  consider neuro eval.
Patient is a 71y old  Male who presents with a chief complaint of I'm having problems with my equilibrium (31 Mar 2017 20:18)      SUBJECTIVE:   HPI:  70 y/o patient with metastatic non small cell lung cancer  that presents to  with complaints of worsening weakness/ dizziness/ ataxia.Patient has history of Metastatic adenocarcinoma of lung. Presented in July 2016 with extensive brain mets, left hilar mass, adenopathy, adrenal mets, retroperitoneal adenopathy , extensive bone mets, abdominal carcinomatosis. He is s/p cyberknife RT to brain mets, RT to thoracic spine.  S/p multiple courses of chemotherapy with good response.  Currently on maintenance with Alimta and avastin- last given on March 10.  CT chest, abdomen and pelvis March7, 2017- ongoing response, continuous improvement. MRI of brain January 2017- response/ improvement in brain mets.  Had intermittent dizziness, poor equilibrium on and off x several months - now progressively worse. He states that he has also had weight loss, poor appetite, and decreased energy such that he is unable to perform ADL's such as bathing or preparing food.  Lives alone at home. No family, no friends, no community support.     4/1: still complains of weakness to b/l LE. denies any urine or bowel incontinenece. wekaness and poor oral intake has been issue for several weeks more so since last chemo 3 weeks ago. hypothermic since last night to 94F. Bare hugger applied. now 97 without. ROS neg for any nidus of infection.         REVIEW OF SYSTEMS:    CONSTITUTIONAL: No weakness, fevers or chills  EYES/ENT: No visual changes;  No vertigo or throat pain   NECK: No pain or stiffness  RESPIRATORY: No cough, wheezing, hemoptysis; No shortness of breath  CARDIOVASCULAR: No chest pain or palpitations  GASTROINTESTINAL: No abdominal or epigastric pain. No nausea, vomiting, or hematemesis; No diarrhea or constipation. No melena or hematochezia.  GENITOURINARY: No dysuria, frequency or hematuria  NEUROLOGICAL: No numbness or weakness  SKIN: No itching, burning, rashes, or lesions   All other review of systems is negative unless indicated above    Height (cm): 172.7 (03-31 @ 21:09)  Weight (kg): 73.4 (03-31 @ 21:09)  BMI (kg/m2): 24.6 (03-31 @ 21:09)  BSA (m2): 1.87 (03-31 @ 21:09)    Vital Signs Last 24 Hrs  T(C): 36.2, Max: 36.2 (04-01 @ 11:13)  T(F): 97.2, Max: 97.2 (04-01 @ 11:13)  HR: 68 (67 - 76)  BP: 91/57 (80/65 - 113/68)  BP(mean): --  RR: 16 (16 - 19)  SpO2: 100% (94% - 100%)    I&O's Summary  I & Os for 24h ending 01 Apr 2017 07:00  =============================================  IN: 882 ml / OUT: 0 ml / NET: 882 ml    I & Os for current day (as of 01 Apr 2017 11:42)  =============================================  IN: 0 ml / OUT: 90 ml / NET: -90 ml      CAPILLARY BLOOD GLUCOSE      PHYSICAL EXAM:    Constitutional: NAD, awake and alert, well-developed  HEENT: PERR, EOMI, Normal Hearing, MMM  Neck: Soft and supple, No LAD, No JVD  Respiratory: Breath sounds are clear bilaterally, No wheezing, rales or rhonchi  Cardiovascular: S1 and S2, regular rate and rhythm, no Murmurs, gallops or rubs  Gastrointestinal: Bowel Sounds present, soft, nontender, nondistended, no guarding, no rebound  Extremities: No peripheral edema  Vascular: 2+ peripheral pulses  Neurological: A/O x 3, no focal deficits  Musculoskeletal: 5/5 strength b/l upper and lower extremities  Skin: No rashes    MEDICATIONS:  MEDICATIONS  (STANDING):  heparin  Injectable 5000Unit(s) SubCutaneous every 12 hours  lactated ringers. 1000milliLiter(s) IV Continuous <Continuous>  folic acid 1milliGRAM(s) Oral daily  magnesium oxide 400milliGRAM(s) Oral once      LABS: All Labs Reviewed:                        10.4   4.2   )-----------( 240      ( 01 Apr 2017 05:54 )             31.4     01 Apr 2017 05:54    136    |  101    |  14     ----------------------------<  69     3.6     |  20     |  1.17     Ca    7.9        01 Apr 2017 05:54  Phos  3.3       01 Apr 2017 05:54  Mg     1.5       01 Apr 2017 05:54    TPro  6.7    /  Alb  2.6    /  TBili  0.4    /  DBili  x      /  AST  139    /  ALT  32     /  AlkPhos  125    31 Mar 2017 12:45    PT/INR - ( 31 Mar 2017 12:45 )   PT: 12.8 sec;   INR: 1.18 ratio         PTT - ( 31 Mar 2017 12:45 )  PTT:53.3 sec          Blood Culture:     RADIOLOGY/EKG:

## 2017-04-04 NOTE — PROGRESS NOTE ADULT - PROBLEM SELECTOR PROBLEM 5
MAXX (acute kidney injury)

## 2017-04-04 NOTE — PROVIDER CONTACT NOTE (OTHER) - ASSESSMENT
Pt disoriented and confused. BP 88/52. Oxygen 95% on 50% VM. Rectal temp 94.8 despite bearhugger.
Pt found unresponsive.
Pt is lethargic but arousable  No complaints just says he wants to sleep and be left alone

## 2017-04-04 NOTE — PROVIDER CONTACT NOTE (OTHER) - DATE AND TIME:
01-Apr-2017 18:29
02-Apr-2017 18:51
04-Apr-2017 11:30
04-Apr-2017 15:45
01-Apr-2017 14:35
01-Apr-2017 16:18
01-Apr-2017 17:00
02-Apr-2017 05:27
02-Apr-2017 13:56
02-Apr-2017 18:39
02-Apr-2017 21:20
04-Apr-2017 15:38
04-Apr-2017 15:39
31-Mar-2017 20:56

## 2017-04-04 NOTE — PROVIDER CONTACT NOTE (OTHER) - NAME OF MD/NP/PA/DO NOTIFIED:
Dr. Parker
Dr. Parker called; Dr Bravo at bedside
[facility]
Dr. Ordaz
MD Herrera
Parker
[facility]
dr. craig
dr. ravi
Chase gutiérrez
Dr. Flynn
dr. craig
dr. ravi

## 2017-04-04 NOTE — PROGRESS NOTE ADULT - PROBLEM SELECTOR PLAN 4
s/p 1 liter of fluid in ER would give additional liter of LR then maintenance fluids at 125 cc/hr of LR  check bmp in am
s/p 1 liter of fluid in ER would give additional liter of LR then maintenance fluids at 125 cc/hr of LR
s/p 1 liter of fluid in ER would give additional liter of LR then maintenance fluids at 125 cc/hr of LR  check bmp in am
s/p 1 liter of fluid in ER would give additional liter of LR then maintenance fluids at 125 cc/hr of LR  check bmp in am

## 2017-04-04 NOTE — PROVIDER CONTACT NOTE (OTHER) - ACTION/TREATMENT ORDERED:
1 mg ativan ordered and administered. Pt now back on oxygen mask with sats improved to 96%
Pt to be evaluated by intensivist for upgrade.
nurses on [address] [place] [address] informed of the low urine production as well as the resident
place pt back on bearhugger if oral temp is below 97.0
At 1540 CPR was terminated per Dr. Parker as DNR/DNI initiated and signed by Dr. Parker and Dr. Khalil.
left message for dr. ravi to let her know about the pts low urine production. will notify the residents as well
md examined the pt in person
will continue to monitor. pt receiving a 1 liter bolus will be back in touch with dr craig after bolus finishes
1 liter ns bolus stat was ordered. will recheck the blood pressure after the bolus has finished and call dr. ravi with results
See Above
dr. ravi is going to assess the pt
spoke directly to dr. ambriz, he said that he will be in to see the pt
Rapid response called.
MD will come to assess patient.  Pending orders.

## 2017-04-04 NOTE — PROVIDER CONTACT NOTE (CHANGE IN STATUS NOTIFICATION) - SITUATION
Pt had two episodes of bradycardia in the 30's on telemetry monitor. Not sustained. 2nd degree type 1 heart block noted.

## 2017-04-04 NOTE — PROVIDER CONTACT NOTE (OTHER) - REASON
Consultant
Low BP
Possible Pink Eye? + Hypothermia
Pulseless and agonal breathing
bradycardia and hypoxia
hypotension, asymptomatic
low urine production
neuro consult request
periods of hallucinations
petechiae on right arm, decrease in hgb, swollen and redenned eyes bilaterally
put pt back on bearhugger if oral temp is less then 97
urine output is inadequate
Hematology/Oncology Consult
low urine output

## 2017-04-04 NOTE — PROGRESS NOTE ADULT - ATTENDING COMMENTS
Discussed all components with Dr Baer, Dr An who has seen patient remotely in the community and has not named any friend or relative HCP. Presently no plans for any systemic chemo or RTX. Pt repsonded well to above per oncology. Based on symptoms AMS and deleirum may be related to etoh and ataxia related to new onset wernicke's. Hypoxemia will need to be further investigated with PE study. With no active surrogates in place, will arrange for 2 person administrative consent for further studies. If further decompensation will sign for DNR/DNI by myself and Dr Khalil

## 2017-04-04 NOTE — PROVIDER CONTACT NOTE (CHANGE IN STATUS NOTIFICATION) - ACTION/TREATMENT ORDERED:
Concern for PE. MD to order further testing.
Dr. Payan to be consulted
STAT EKG, bolus, morphine, nitro, aspirin, 2L nasal canula placed, Dr. Vee and Dr. Ayala at bedside.

## 2017-04-04 NOTE — PROGRESS NOTE ADULT - PROBLEM SELECTOR PLAN 8
pan culture and monitor off abx for now  resolved
pan culture and monitor off abx for now

## 2017-04-04 NOTE — PROGRESS NOTE ADULT - ASSESSMENT
72 y/o male with Stage IV small cell lung cancer that presents with progressively worsening weakness, gait imbalance, and fatigue    Admit to Med/surg  OOB to chair with assistance  Fall Risk Protocol   Vitals per routine
70 y/o male with Stage IV small cell lung cancer that presents with progressively worsening weakness, gait imbalance, and fatigue    Admit to Med/surg  OOB to chair with assistance  Fall Risk Protocol   Vitals per routine
70 y/o male with metastatic adenocarcinoma of lung , on chemotherapy ( systemic disease responding) history of brain mets s/p cyberknife RT 2016- admitted with worsening of dizziness/ ataxia.   MRI of brain - no evidence of progression, does not explain his current status.  Encephalopathy - ? metabolic, ? paraneoplastic, ? post RT .   Will discuss with neurology if further testing should  help in management.   PT evaluation.   evaluation. Hold off further chemotherapy.   He lives alone, no family and social support- will not be able to  continue to live independently alone.
72 y/o male with metastatic adenocarcinoma of lung , on chemotherapy ( systemic disease responding) history of brain mets s/p cyberknife RT 2016- now with worsening of dizziness/ ataxia- r/o progression of CNS mets.   Dehydration.   Hydrate.     MRI of brain with/without gadolinium. ( MRI to compare to- done in January 2017- [facility]).     Will benefit from palliative care evaluation/  evaluation. He is aware of diagnosis and prognosis but unrealistic in expectations that he will be able to continue to  live independently without support.     Dr Flynn will be covering on Saturday and Sunday.  D/w admitting hospitalist.

## 2017-04-06 LAB
CULTURE RESULTS: SIGNIFICANT CHANGE UP
CULTURE RESULTS: SIGNIFICANT CHANGE UP
SPECIMEN SOURCE: SIGNIFICANT CHANGE UP
SPECIMEN SOURCE: SIGNIFICANT CHANGE UP

## 2017-04-07 DIAGNOSIS — T68.XXXA HYPOTHERMIA, INITIAL ENCOUNTER: ICD-10-CM

## 2017-04-07 DIAGNOSIS — C79.72 SECONDARY MALIGNANT NEOPLASM OF LEFT ADRENAL GLAND: ICD-10-CM

## 2017-04-07 DIAGNOSIS — R53.1 WEAKNESS: ICD-10-CM

## 2017-04-07 DIAGNOSIS — C34.02 MALIGNANT NEOPLASM OF LEFT MAIN BRONCHUS: ICD-10-CM

## 2017-04-07 DIAGNOSIS — C78.6 SECONDARY MALIGNANT NEOPLASM OF RETROPERITONEUM AND PERITONEUM: ICD-10-CM

## 2017-04-07 DIAGNOSIS — C79.51 SECONDARY MALIGNANT NEOPLASM OF BONE: ICD-10-CM

## 2017-04-07 DIAGNOSIS — R59.9 ENLARGED LYMPH NODES, UNSPECIFIED: ICD-10-CM

## 2017-04-07 DIAGNOSIS — C79.31 SECONDARY MALIGNANT NEOPLASM OF BRAIN: ICD-10-CM

## 2017-04-07 DIAGNOSIS — F32.9 MAJOR DEPRESSIVE DISORDER, SINGLE EPISODE, UNSPECIFIED: ICD-10-CM

## 2017-04-07 DIAGNOSIS — Z92.21 PERSONAL HISTORY OF ANTINEOPLASTIC CHEMOTHERAPY: ICD-10-CM

## 2017-04-07 DIAGNOSIS — N17.9 ACUTE KIDNEY FAILURE, UNSPECIFIED: ICD-10-CM

## 2017-04-07 DIAGNOSIS — E86.0 DEHYDRATION: ICD-10-CM

## 2017-04-07 DIAGNOSIS — R63.0 ANOREXIA: ICD-10-CM

## 2017-04-07 DIAGNOSIS — J96.01 ACUTE RESPIRATORY FAILURE WITH HYPOXIA: ICD-10-CM

## 2017-04-07 DIAGNOSIS — Z66 DO NOT RESUSCITATE: ICD-10-CM

## 2017-04-07 DIAGNOSIS — D63.0 ANEMIA IN NEOPLASTIC DISEASE: ICD-10-CM

## 2017-04-07 DIAGNOSIS — G93.40 ENCEPHALOPATHY, UNSPECIFIED: ICD-10-CM

## 2017-04-07 LAB — METHYLMALONATE SERPL-SCNC: 193 NMOL/L — SIGNIFICANT CHANGE UP (ref 0–378)

## 2017-04-07 NOTE — CDI QUERY NOTE - NSCDIOTHERTXTBX_GEN_ALL_CORE_HH
1) Per nutritionist: Pt meets criteria for severe protein-calorie malnutrition in chronic disease.  Pt has lung cancer with mets to brain. Pt reports 10% wt loss in past 6 months,. severe muscle depletion in temporal, clavicle,  shoulder  and thigh area, moderate muscle wasting in calves and deltoids, severe fat depletion in orbital and tricep areas.    Please clarify if you agree or disagree with the clinical diagnosis of Severe protein-calorie malnutrition ?    2) Encephalopathy has been documented. Patient with an abrupt change in MS.  Please Clarify the type of encephalopathy ?  ie.. Probable Metabolic encephalopathy ? ( hypothermia temp = 94, MAXX, hypotension)  .... Suspected Wernickes Encephalopathy ? ( hx of etoh use, elevated MCV)  .... Encephalopathy due to RTX ?... Toxic-Metabolic encephalopathy ?  .....Encephalopathy unspecified or unknown ?

## 2017-04-17 DIAGNOSIS — C78.7 SECONDARY MALIGNANT NEOPLASM OF LIVER AND INTRAHEPATIC BILE DUCT: ICD-10-CM

## 2017-04-17 DIAGNOSIS — E51.9 THIAMINE DEFICIENCY, UNSPECIFIED: ICD-10-CM

## 2017-04-17 DIAGNOSIS — F10.10 ALCOHOL ABUSE, UNCOMPLICATED: ICD-10-CM

## 2017-04-17 DIAGNOSIS — G93.41 METABOLIC ENCEPHALOPATHY: ICD-10-CM

## 2017-04-17 DIAGNOSIS — E43 UNSPECIFIED SEVERE PROTEIN-CALORIE MALNUTRITION: ICD-10-CM

## 2018-07-27 NOTE — CONSULT NOTE ADULT - SUBJECTIVE AND OBJECTIVE BOX
Patient is a 71y old  Male who presents with a chief complaint of I'm having problems with my equilibrium (31 Mar 2017 20:18)      HPI:   72 y/o patient with metastatic non small cell lung cancer  that presents to  with complaints of worsening weakness/ dizziness/ ataxia.Patient has history of Metastatic adenocarcinoma of lung. Presented in July 2016 with extensive brain mets, left hilar mass, adenopathy, adrenal mets, retroperitoneal adenopathy , extensive bone mets, abdominal carcinomatosis. He is s/p cyberknife RT to brain mets, RT to thoracic spine.  S/p multiple courses of chemotherapy with good response.  Currently on maintenance with Alimta and avastin- last given on March 10.  CT chest, abdomen and pelvis March7, 2017- ongoing response, continuous improvement. MRI of brain January 2017- response/ improvement in brain mets.  Had intermittent dizziness, poor equilibrium on and off x several months - now progressively worse. He states that he has also had weight loss, poor appetite, and decreased energy such that he is unable to perform ADL's such as bathing or preparing food.  Lives alone at home. No family, no friends, no community support.    Patient is very clear that the problems with gait started shortly after cyberknife treatment this past July.  He states he has fallen a few times due to the unsteady gait.  When specifically asked, he denies visual hallucinations and states his memory is good.  He denies extremity weakness, pain or tingling.      PAST MEDICAL & SURGICAL HISTORY:  Lung cancer  Brain metastases      FAMILY HISTORY:  No pertinent family history in first degree relatives        Social Hx:  Nonsmoker, no drug or alcohol use    MEDICATIONS  (STANDING):  heparin  Injectable 5000Unit(s) SubCutaneous every 12 hours  folic acid 1milliGRAM(s) Oral daily  sodium chloride 0.9%. 1000milliLiter(s) IV Continuous <Continuous>  sodium ferric gluconate complex IVPB 125milliGRAM(s) IV Intermittent daily       Allergies    No Known Drug Allergies  Originally Entered as [Unknown] reaction to [POSION IVY] (Unknown)    Intolerances        ROS: Pertinent positives in HPI, all other ROS were reviewed and are negative.      Vital Signs Last 24 Hrs  T(C): 36.2, Max: 36.4 (04-01 @ 13:42)  T(F): 97.2, Max: 97.5 (04-01 @ 13:42)  HR: 73 (70 - 81)  BP: 97/61 (78/54 - 110/60)  BP(mean): --  RR: 18 (18 - 18)  SpO2: 96% (96% - 99%)        Constitutional: awake and alert.  HEENT: PERRLA, EOMI,   Neck: Supple.  Respiratory: Breath sounds are clear bilaterally  Cardiovascular: S1 and S2, regular / irregular rhythm  Gastrointestinal: soft, nontender  Extremities:  1+ ankle edema  Vascular: Caritid Bruit - no  Musculoskeletal: no joint swelling/tenderness, no abnormal movements  Skin: No rashes    Neurological exam:  HF: A x O x 3. Appropriately interactive, normal affect. Speech fluent, No Aphasia or paraphasic errors. Naming /repetition intact.  MMSE 27/30.  He states there are people in front of him who are not there   CN: MARIANO, EOMI, VFF, there is horizontal nystagmus in each direction of gaze, with higher amplitude with right gaze.  facial sensation normal, no NLFD, tongue midline, Palate moves equally, SCM equal bilaterally  Motor: No pronator drift, Strength 5/5 in all 4 ext, normal bulk and tone, no tremor, rigidity or bradykinesia.    Sens: Intact to light touch / PP/ VS/ JS    Reflexes: Symmetric and normal, 1+ throughout, downgoing toes b/l  Coord:  normal FNF, mildly ataxic HKS    Gait/Balance: broad based and ataxic        Labs:                        9.6    4.9   )-----------( 236      ( 02 Apr 2017 05:53 )             28.3     02 Apr 2017 05:53    140    |  106    |  15     ----------------------------<  78     3.5     |  21     |  1.13     Ca    7.8        02 Apr 2017 05:53  Phos  3.3       01 Apr 2017 05:54  Mg     1.5       01 Apr 2017 05:54    TPro  6.7    /  Alb  2.6    /  TBili  0.4    /  DBili  x      /  AST  139    /  ALT  32     /  AlkPhos  125    31 Mar 2017 12:45        PT/INR - ( 31 Mar 2017 12:45 )   PT: 12.8 sec;   INR: 1.18 ratio         PTT - ( 31 Mar 2017 12:45 )  PTT:53.3 sec    Radiology report:  - CT head:  No evidence for acute infarct or acute hemorrhage.    On the prior MRI study from 07/08/2016, enhancing metastatic lesions were   noted involving the jayy, left cerebellum, and right parietal lobe.   Evaluation of intracranial metastatic disease on the current study is   quite limited by lack of intravenous contrast. Consider follow-up brain   MRI study with and without contrast, especially if the patient has new   and persistent symptoms.    - MRI brain  IMPRESSION: No acute infarct or other acute abnormality. No abnormal   enhancement to suggest recurrence of metastases.  Several scattered small   foci of T2/FLAIR prolongation in the periventricular white matter,   compatible with stable mild chronic microvascular ischemic changes.       A/P:  Patient's ataxia and visual hallucinations are not due to recurrent brain metastases.  The differential diagnosis includes radiation encephalopathy although there are no WM hyperintensities on MRI.  Paraneoplastic syndrome is also in the differential although he does not present with a classic PNS.  He could have atypical cerebellar degeneration with visual hallucinations.      Would have PT work with patient for gait stability.    Will consider LP tomorrow to r/o paraneoplastic encephalitis syndrome. no

## 2019-10-23 NOTE — H&P ADULT - PROBLEM SELECTOR PROBLEM 6
Ok for regular lipid... not sure why we would be ordering fancy lipid unless prepping for referal to cardio (like in the ordering of NMR lipi)     Still have to get to pulm as no ordering of home sleep unit on epic and I am not sure how to get from pharmacy or supply company     Elevated liver enzymes

## 2020-10-05 NOTE — ED PROVIDER NOTE - AGGRAVATING FACTORS
15: 09PM Inbound call from [facility] @ [address] [place] [address] Drive is requesting the top sheet for [facility] paperwork be re-sent. SW acknowledged understanding.   Top sheet to be faxed to [facility].      MIMI Barber   standing

## 2020-11-30 NOTE — ED PROVIDER NOTE - CPE EDP ENMT NORM
normal... Oculoplastic Surgeon Procedure Text (F): After obtaining clear surgical margins the patient was sent to oculoplastics for surgical repair.  The patient understands they will receive post-surgical care and follow-up from the referring physician's office.

## 2023-12-06 NOTE — DIETITIAN INITIAL EVALUATION ADULT. - DATE OF WEIGHT PRIOR TO ADM
Care Management/Social Work Discharge Arrangements: No need for help at home was identified during your hospital stay. If you feel you need more help please contact your primary care provider for additional resources.     
01-Nov-2016